# Patient Record
Sex: FEMALE | Race: WHITE | NOT HISPANIC OR LATINO | URBAN - METROPOLITAN AREA
[De-identification: names, ages, dates, MRNs, and addresses within clinical notes are randomized per-mention and may not be internally consistent; named-entity substitution may affect disease eponyms.]

---

## 2020-07-25 ENCOUNTER — TELEPHONE ENCOUNTER (OUTPATIENT)
Dept: URBAN - METROPOLITAN AREA CLINIC 13 | Facility: CLINIC | Age: 75
End: 2020-07-25

## 2020-07-25 RX ORDER — TRAMADOL HYDROCHLORIDE 50 MG/1
TAKE 1 TABLET BY MOUTH EVERY 6 HOURS AS NEEDED FOR PAIN TABLET ORAL
Qty: 60 | Refills: 1 | OUTPATIENT
Start: 2018-04-12 | End: 2019-10-16

## 2020-07-25 RX ORDER — VALSARTAN 80 MG/1
TAKE 1 TABLET DAILY TABLET ORAL
Refills: 0 | OUTPATIENT
End: 2018-08-10

## 2020-07-25 RX ORDER — HYDROCORTISONE ACETATE 25 MG/1
INSERT 1 SUPP BEDTIME SUPPOSITORY RECTAL
Qty: 10 | Refills: 0 | OUTPATIENT
Start: 2018-08-10 | End: 2019-10-16

## 2020-07-25 RX ORDER — CALCIUM CARBONATE/VITAMIN D3 600 MG-10
TAKE AS DIRECTED TABLET ORAL
Refills: 0 | OUTPATIENT
End: 2019-11-13

## 2020-07-25 RX ORDER — OMEPRAZOLE 20 MG/1
TAKE 1 CAPSULE DAILY CAPSULE, DELAYED RELEASE ORAL
Refills: 4 | OUTPATIENT
End: 2019-10-16

## 2020-07-25 RX ORDER — SOTALOL HYDROCHLORIDE 80 MG/1
TAKE 1 TABLET EVERY 12 HOURS DAILY TABLET ORAL
Refills: 0 | OUTPATIENT
End: 2019-10-16

## 2020-07-25 RX ORDER — SACCHAROMYCES BOULARDII 250 MG
TAKE 1 CAPSULE DAILY. UNKNOWN DOSAGE PER PATIENT CAPSULE ORAL
Refills: 0 | OUTPATIENT
End: 2019-10-16

## 2020-07-25 RX ORDER — NYSTATIN 100000 [USP'U]/ML
APPLY 1 ML 4 TIMES DAILY TO EACH CHEEK WITH DROPPER, THEN MASSAGE WITH CLEAN FINGER SUSPENSION ORAL
Refills: 0 | OUTPATIENT
End: 2017-07-24

## 2020-07-26 ENCOUNTER — TELEPHONE ENCOUNTER (OUTPATIENT)
Dept: URBAN - METROPOLITAN AREA CLINIC 13 | Facility: CLINIC | Age: 75
End: 2020-07-26

## 2020-07-26 RX ORDER — LEVOTHYROXINE SODIUM 0.07 MG/1
TABLET ORAL
Qty: 90 | Refills: 0 | Status: ACTIVE | COMMUNITY
Start: 2019-08-27

## 2020-07-26 RX ORDER — TRAMADOL HYDROCHLORIDE 50 MG/1
TABLET ORAL
Qty: 20 | Refills: 0 | Status: ACTIVE | COMMUNITY
Start: 2018-04-12

## 2020-07-26 RX ORDER — HYDROCODONE BITARTRATE AND ACETAMINOPHEN 5; 325 MG/1; MG/1
TABLET ORAL
Qty: 20 | Refills: 0 | Status: ACTIVE | COMMUNITY
Start: 2018-04-16

## 2020-07-26 RX ORDER — AZITHROMYCIN DIHYDRATE 250 MG/1
TABLET, FILM COATED ORAL
Qty: 6 | Refills: 0 | Status: ACTIVE | COMMUNITY
Start: 2019-10-04

## 2020-07-26 RX ORDER — OLMESARTAN MEDOXOMIL 20 MG/1
TAKE 1 TABLET DAILY TABLET, FILM COATED ORAL
Refills: 0 | Status: ACTIVE | COMMUNITY
Start: 2018-07-16

## 2020-07-26 RX ORDER — UBIDECARENONE 100 MG
TAKE 1 CAPSULE TWICE DAILY CAPSULE ORAL
Refills: 0 | Status: ACTIVE | COMMUNITY

## 2020-07-26 RX ORDER — LEVOTHYROXINE SODIUM 0.05 MG/1
TABLET ORAL
Qty: 90 | Refills: 0 | Status: ACTIVE | COMMUNITY
Start: 2019-10-29

## 2020-07-26 RX ORDER — CALCIUM CARBONATE 500(1250)
TAKE 1 TABLET DAILY TABLET ORAL
Refills: 0 | Status: ACTIVE | COMMUNITY

## 2020-07-26 RX ORDER — ESZOPICLONE 2 MG
TAKE 1 TABLET AT BEDTIME TABLET ORAL
Refills: 0 | Status: ACTIVE | COMMUNITY

## 2020-07-26 RX ORDER — NEOMYCIN AND POLYMYXIN B SULFATES AND DEXAMETHASONE 1; 3.5; 1 MG/G; MG/G; [IU]/G
OINTMENT OPHTHALMIC
Qty: 4 | Refills: 0 | Status: ACTIVE | COMMUNITY
Start: 2019-01-18

## 2020-07-26 RX ORDER — AMOXICILLIN 500 MG/1
CAPSULE ORAL
Qty: 23 | Refills: 0 | Status: ACTIVE | COMMUNITY
Start: 2018-05-04

## 2020-07-26 RX ORDER — ROSUVASTATIN CALCIUM 5 MG
TAKE 1 TABLET DAILY TABLET ORAL
Refills: 0 | Status: ACTIVE | COMMUNITY

## 2020-07-26 RX ORDER — AZITHROMYCIN DIHYDRATE 250 MG/1
TABLET, FILM COATED ORAL
Qty: 6 | Refills: 0 | Status: ACTIVE | COMMUNITY
Start: 2018-01-04

## 2020-07-26 RX ORDER — OSELTAMIVIR PHOSPHATE 75 MG/1
CAPSULE ORAL
Qty: 10 | Refills: 0 | Status: ACTIVE | COMMUNITY
Start: 2018-01-15

## 2020-07-26 RX ORDER — NEOMYCIN SULFATE, POLYMYXIN B SULFATE AND DEXAMETHASONE 3.5; 10000; 1 MG/ML; [USP'U]/ML; MG/ML
SUSPENSION/ DROPS OPHTHALMIC
Qty: 5 | Refills: 0 | Status: ACTIVE | COMMUNITY
Start: 2019-01-17

## 2020-07-26 RX ORDER — LEVOTHYROXINE SODIUM 0.05 MG/1
TAKE 1 TABLET DAILY TABLET ORAL
Refills: 0 | Status: ACTIVE | COMMUNITY

## 2020-07-26 RX ORDER — FLECAINIDE ACETATE 50 MG/1
TABLET ORAL
Qty: 30 | Refills: 0 | Status: ACTIVE | COMMUNITY
Start: 2019-09-19

## 2020-07-26 RX ORDER — CEPHALEXIN 500 MG/1
CAPSULE ORAL
Qty: 14 | Refills: 0 | Status: ACTIVE | COMMUNITY
Start: 2019-01-17

## 2020-07-26 RX ORDER — PENICILLIN V POTASSIUM 500 MG/1
TABLET, FILM COATED ORAL
Qty: 40 | Refills: 0 | Status: ACTIVE | COMMUNITY
Start: 2018-04-12

## 2020-07-26 RX ORDER — TOBRAMYCIN AND DEXAMETHASONE 3; 1 MG/ML; MG/ML
SUSPENSION/ DROPS OPHTHALMIC
Qty: 2 | Refills: 0 | Status: ACTIVE | COMMUNITY
Start: 2018-01-05

## 2020-07-26 RX ORDER — LEVOTHYROXINE SODIUM 0.05 MG/1
TABLET ORAL
Qty: 90 | Refills: 0 | Status: ACTIVE | COMMUNITY
Start: 2018-08-23

## 2020-07-26 RX ORDER — OMEPRAZOLE 20 MG/1
TAKE 1 CAPSULE TWICE DAILY 30 MINUTES PRIOR TO BREAKFAST AND DINNER CAPSULE, DELAYED RELEASE ORAL
Qty: 180 | Refills: 3 | Status: ACTIVE | COMMUNITY
Start: 2017-12-05

## 2020-07-26 RX ORDER — DOXYCYCLINE 50 MG/1
CAPSULE ORAL
Qty: 21 | Refills: 0 | Status: ACTIVE | COMMUNITY
Start: 2019-04-11

## 2020-07-26 RX ORDER — RALOXIFENE HCL 60 MG
TAKE 1 TABLET DAILY TABLET ORAL
Refills: 0 | Status: ACTIVE | COMMUNITY

## 2020-10-14 ENCOUNTER — OFFICE VISIT (OUTPATIENT)
Dept: URBAN - METROPOLITAN AREA CLINIC 113 | Facility: CLINIC | Age: 75
End: 2020-10-14
Payer: MEDICARE

## 2020-10-14 VITALS
HEART RATE: 98 BPM | BODY MASS INDEX: 26.12 KG/M2 | RESPIRATION RATE: 18 BRPM | DIASTOLIC BLOOD PRESSURE: 87 MMHG | TEMPERATURE: 98.2 F | WEIGHT: 153 LBS | HEIGHT: 64 IN | SYSTOLIC BLOOD PRESSURE: 155 MMHG

## 2020-10-14 DIAGNOSIS — K21.9 GASTROESOPHAGEAL REFLUX DISEASE, UNSPECIFIED WHETHER ESOPHAGITIS PRESENT: ICD-10-CM

## 2020-10-14 DIAGNOSIS — R05 COUGH: ICD-10-CM

## 2020-10-14 PROCEDURE — G8427 DOCREV CUR MEDS BY ELIG CLIN: HCPCS | Performed by: NURSE PRACTITIONER

## 2020-10-14 PROCEDURE — 99213 OFFICE O/P EST LOW 20 MIN: CPT | Performed by: NURSE PRACTITIONER

## 2020-10-14 RX ORDER — FLUTICASONE PROPIONATE 50 UG/1
1 SPRAY IN EACH NOSTRIL SPRAY, METERED NASAL ONCE A DAY
Status: ACTIVE | COMMUNITY

## 2020-10-14 RX ORDER — RALOXIFENE HCL 60 MG
TAKE 1 TABLET DAILY TABLET ORAL
Refills: 0 | Status: ACTIVE | COMMUNITY

## 2020-10-14 RX ORDER — OMEPRAZOLE 40 MG/1
TAKE 1 CAPSULE TWICE DAILY 30 MINUTES PRIOR TO BREAKFAST AND DINNER CAPSULE, DELAYED RELEASE ORAL
Refills: 3 | Status: ACTIVE | COMMUNITY
Start: 2017-12-05

## 2020-10-14 RX ORDER — ROSUVASTATIN CALCIUM 5 MG
TAKE 1 TABLET DAILY TABLET ORAL
Refills: 0 | Status: ACTIVE | COMMUNITY

## 2020-10-14 RX ORDER — LEVOTHYROXINE SODIUM 0.07 MG/1
TABLET ORAL
Qty: 90 | Refills: 0 | Status: ACTIVE | COMMUNITY
Start: 2019-08-27

## 2020-10-14 RX ORDER — ESZOPICLONE 2 MG
TAKE 1 TABLET AT BEDTIME TABLET ORAL
Refills: 0 | Status: ACTIVE | COMMUNITY

## 2020-10-14 RX ORDER — OLMESARTAN MEDOXOMIL 20 MG/1
TAKE 1 TABLET DAILY TABLET, FILM COATED ORAL
Refills: 0 | Status: ACTIVE | COMMUNITY
Start: 2018-07-16

## 2020-10-14 NOTE — HPI-TODAY'S VISIT:
This is a 74-year-old female with a history of hypothyroidism, vitamin D deficiency, hyperlipidemia, leukopenia, hypertension, aortic regurgitation, SVT, osteopenia, Sjogren's, and epigastric abdominal pain presenting for follow-up.   She was last seen 12/17/2019.  She had  experienced epigastric pain and chest pressure which were determined to be side effects of flecainide.  Her symptoms had resolved when she stopped the medication.  Prior work-up including labs and a CT scan were unremarkable.  She had not responded to omeprazole and was instructed that she could discontinue it. She reports onset of symptoms in late September.  She felt unwell with extreme fatigue and dry cough.  She began having chest discomfort further described as "soreness in my sternum."  This was aggravated by outside pressure.  She reports an associated sensation that, if she bends at the waist, she feels "a flip-flop or something not where it should be" in her chest.  She has a chronic dry mouth for which she drinks frequently and eats frequently.  She states this causes her to "take any ear.  Frequently, when she is eating or drinking, she has an isolated hiccup without belching.  She denies heartburn, regurgitation, dysphagia, or any other abdominal symptoms.  She was tested for Covid which was reportedly negative.  She had pulmonary function test reporting "excellent" results.  Her primary care physician reported a finding of fluid in your ears and postnasal drip.  She increased omeprazole to 40 mg daily and began using Flonase daily.  Her cough and chest soreness has improved. Cough was not associated with meals, lying flat, or occurring in the early morning when it was present.  She takes Aleve less than once a week.  She is having regular bowel movements without red blood or melena. EGD in November 2019 notable for fundic gland polyps.

## 2020-10-15 PROBLEM — 49727002: Status: ACTIVE | Noted: 2020-10-15

## 2020-11-16 ENCOUNTER — OFFICE VISIT (OUTPATIENT)
Dept: URBAN - METROPOLITAN AREA CLINIC 113 | Facility: CLINIC | Age: 75
End: 2020-11-16
Payer: MEDICARE

## 2020-11-16 ENCOUNTER — WEB ENCOUNTER (OUTPATIENT)
Dept: URBAN - METROPOLITAN AREA CLINIC 113 | Facility: CLINIC | Age: 75
End: 2020-11-16

## 2020-11-16 VITALS
WEIGHT: 153 LBS | RESPIRATION RATE: 18 BRPM | SYSTOLIC BLOOD PRESSURE: 141 MMHG | HEIGHT: 64 IN | HEART RATE: 76 BPM | BODY MASS INDEX: 26.12 KG/M2 | TEMPERATURE: 98.1 F | DIASTOLIC BLOOD PRESSURE: 77 MMHG

## 2020-11-16 DIAGNOSIS — R07.89 ATYPICAL CHEST PAIN: ICD-10-CM

## 2020-11-16 DIAGNOSIS — K21.9 GASTROESOPHAGEAL REFLUX DISEASE, UNSPECIFIED WHETHER ESOPHAGITIS PRESENT: ICD-10-CM

## 2020-11-16 PROBLEM — 235595009: Status: ACTIVE | Noted: 2020-10-15

## 2020-11-16 PROBLEM — 102589003: Status: ACTIVE | Noted: 2020-11-16

## 2020-11-16 PROCEDURE — 99213 OFFICE O/P EST LOW 20 MIN: CPT | Performed by: NURSE PRACTITIONER

## 2020-11-16 PROCEDURE — G8427 DOCREV CUR MEDS BY ELIG CLIN: HCPCS | Performed by: NURSE PRACTITIONER

## 2020-11-16 PROCEDURE — G8482 FLU IMMUNIZE ORDER/ADMIN: HCPCS | Performed by: NURSE PRACTITIONER

## 2020-11-16 RX ORDER — ESZOPICLONE 2 MG
TAKE 1 TABLET AT BEDTIME TABLET ORAL
Refills: 0 | Status: ACTIVE | COMMUNITY

## 2020-11-16 RX ORDER — RALOXIFENE HCL 60 MG
TAKE 1 TABLET DAILY TABLET ORAL
Refills: 0 | Status: ACTIVE | COMMUNITY

## 2020-11-16 RX ORDER — LEVOTHYROXINE SODIUM 0.07 MG/1
1 CAPSULE TABLET ORAL ONCE A DAY
Refills: 0 | Status: ACTIVE | COMMUNITY
Start: 2019-08-27

## 2020-11-16 RX ORDER — ROSUVASTATIN CALCIUM 5 MG
TAKE 1 TABLET DAILY TABLET ORAL
Refills: 0 | Status: ACTIVE | COMMUNITY

## 2020-11-16 RX ORDER — FLUTICASONE PROPIONATE 50 UG/1
1 SPRAY IN EACH NOSTRIL SPRAY, METERED NASAL ONCE A DAY
Status: ACTIVE | COMMUNITY

## 2020-11-16 RX ORDER — OLMESARTAN MEDOXOMIL 20 MG/1
TAKE 1 TABLET DAILY TABLET, FILM COATED ORAL
Refills: 0 | Status: ACTIVE | COMMUNITY
Start: 2018-07-16

## 2020-11-16 RX ORDER — OMEPRAZOLE 40 MG/1
TAKE 1 CAPSULE TWICE DAILY 30 MINUTES PRIOR TO BREAKFAST AND DINNER CAPSULE, DELAYED RELEASE ORAL
Refills: 3 | Status: ACTIVE | COMMUNITY
Start: 2017-12-05

## 2020-11-16 NOTE — HPI-TODAY'S VISIT:
This is a 74-year-old female with a history of hypothyroidism, vitamin D deficiency, hyperlipidemia, leukopenia, hypertension, aortic regurgitation, SVT, osteopenia, Sjogren's, epigastric pain, GERD, and cough presenting to discuss an EGD  She was last seen 10/14/2020.  She reported onset of symptoms in late September including extreme fatigue, a dry cough unrelated to meals, and "soreness" in the sternal area.  She had an odd sensation in her chest associated with bending at the waist.  She reported negative Covid testing and a normal pulmonary function test.  Her primary care physician reported a finding of fluid in her ears and a postnasal drip.  Omeprazole was increased to 40 mg daily and she began using Flonase daily.  Cough and chest soreness improved.  It was discussed that her symptoms may have been multifactorial associated with acid reflux and postnasal drip.  She was instructed to continue omeprazole 40 mg daily and reduce to 20 mg daily in 2 to 3 months.  She was to discuss duration of therapy with Flonase with her primary care physician.  She continues to take omeprazole 40 mg daily.  Her cough has improved.  It continues to occur intermittently.  She is complaining of "soreness and pressure" in her chest.  Symptoms are located in the substernal area.  She reports discomfort associated with making certain movements.  She denies symptoms  Discomfort is unassociated with exercise or exertion.  She has been unable to wear her bra because outside pressure aggravates symptoms.  Bending at the waist and twisting exacerbate pain.  She has not identified a clear association with eating.  She denies heartburn, regurgitation, dysphagia, abdominal pain, nausea, or any other abdominal symptoms.  CT scan.  She reports good pulmonary function testing and a negative chest x-ray twice last year.  Her pulmonologist has suggested  CT scan but she is concerned regarding radiation exposure. She is under regular care of her cardiologist with whom she will follow up in January but has not discussed her symptoms with him.  She considered self treating  for possible costochondritis with Advil for 10 days but was concerned that, if her symptoms were associated with her esophagus, she may aggravate this condition.

## 2020-12-22 ENCOUNTER — LAB OUTSIDE AN ENCOUNTER (OUTPATIENT)
Dept: URBAN - METROPOLITAN AREA CLINIC 113 | Facility: CLINIC | Age: 75
End: 2020-12-22

## 2020-12-22 ENCOUNTER — OFFICE VISIT (OUTPATIENT)
Dept: URBAN - METROPOLITAN AREA CLINIC 113 | Facility: CLINIC | Age: 75
End: 2020-12-22
Payer: MEDICARE

## 2020-12-22 VITALS
HEIGHT: 64 IN | DIASTOLIC BLOOD PRESSURE: 73 MMHG | SYSTOLIC BLOOD PRESSURE: 139 MMHG | BODY MASS INDEX: 26.29 KG/M2 | TEMPERATURE: 97.7 F | HEART RATE: 74 BPM | WEIGHT: 154 LBS

## 2020-12-22 DIAGNOSIS — R10.13 EPIGASTRIC ABDOMINAL PAIN: ICD-10-CM

## 2020-12-22 PROCEDURE — 99214 OFFICE O/P EST MOD 30 MIN: CPT | Performed by: INTERNAL MEDICINE

## 2020-12-22 RX ORDER — OLMESARTAN MEDOXOMIL 20 MG/1
TAKE 1 TABLET DAILY TABLET, FILM COATED ORAL
Refills: 0 | Status: ACTIVE | COMMUNITY
Start: 2018-07-16

## 2020-12-22 RX ORDER — ROSUVASTATIN CALCIUM 5 MG
TAKE 1 TABLET DAILY TABLET ORAL
Refills: 0 | Status: ACTIVE | COMMUNITY

## 2020-12-22 RX ORDER — RALOXIFENE HCL 60 MG
TAKE 1 TABLET DAILY TABLET ORAL
Refills: 0 | Status: ACTIVE | COMMUNITY

## 2020-12-22 RX ORDER — ESZOPICLONE 1 MG/1
TAKE 1 TABLET AT BEDTIME TABLET, COATED ORAL
Refills: 0 | Status: ACTIVE | COMMUNITY

## 2020-12-22 RX ORDER — LEVOTHYROXINE SODIUM 0.07 MG/1
1 CAPSULE TABLET ORAL ONCE A DAY
Refills: 0 | Status: ACTIVE | COMMUNITY
Start: 2019-08-27

## 2020-12-22 RX ORDER — OMEPRAZOLE 40 MG/1
TAKE 1 CAPSULE TWICE DAILY 30 MINUTES PRIOR TO BREAKFAST AND DINNER CAPSULE, DELAYED RELEASE ORAL
Refills: 3 | Status: ACTIVE | COMMUNITY
Start: 2017-12-05

## 2020-12-22 NOTE — HPI-TODAY'S VISIT:
This is a 75-year-old female with a history of hypothyroidism, vitamin D deficiency, hyperlipidemia, leukopenia, hypertension, aortic regurgitation, SVT, osteopenia, Sjogren's, epigastric pain, GERD, and cough presenting to discuss an EGD  In late September2020, she began having complaints of extreme fatigue, a dry cough unrelated to meals, and "soreness" in the sternal area.  She had an odd sensation in her chest associated with bending at the waist.  She reported negative Covid testing and normal pulmonary function tests.  Her primary care physician reported a finding of fluid in her ears and a postnasal drip.  Omeprazole was increased to 40 mg daily and she began using Flonase daily.  Cough and chest soreness improved.  It was discussed that her symptoms may have been multifactorial associated with acid reflux and postnasal drip.  She was instructed to continue omeprazole 40 mg daily and reduce to 20 mg daily in 2 to 3 months.  She was to discuss duration of  Flonase treatmentwith her primary care physician.  When she was last seen here on November 16, 2020, her cough had improved although still occurred intermittently.  She still complains of"soreness and pressure" in her chest.  Symptoms are located in the substernal area.  She reports discomfort associated with making certain movements.  Discomfort is unassociated with exercise or exertion.  She has been unable to wear her bra because outside pressure aggravates symptoms.  Bending at the waist and twisting exacerbate pain.  She has not identified a clear association with eating.  She denies heartburn, regurgitation, dysphagia, abdominal pain, nausea, or any other abdominal symptoms.  CT scan.  She reports good pulmonary function testing and a negative chest x-ray twice last year.  Her pulmonologist has suggested  CT scan but she is concerned regarding radiation exposure. She is under regular care of her cardiologist with whom she will follow up in January but has not discussed her symptoms with him.  She considered self treating  for possible costochondritis with Advil for 10 days but was concerned that, if her symptoms were associated with her esophagus, she may aggravate this condition.     The patient continues to have this discomfort.  As a pressure type discomfort which is uncomfortable for her.  She tried increasing her omeprazole to 40 mg twice daily with no effect.  She has no reflux symptoms.  She had a recent CT angiogram done and the [facility] center which was negative.  However, incidentally, she was found to have hypertrophic thoracic spondylosis, an incidental 9 mm calcified splenic artery aneurysm, and hypertrophic gastritis.  Because of the hypertrophic gastritis, Consultation was requested for consideration of upperendoscopy.

## 2021-01-08 ENCOUNTER — CLAIMS CREATED FROM THE CLAIM WINDOW (OUTPATIENT)
Dept: URBAN - METROPOLITAN AREA CLINIC 4 | Facility: CLINIC | Age: 76
End: 2021-01-08
Payer: MEDICARE

## 2021-01-08 ENCOUNTER — OFFICE VISIT (OUTPATIENT)
Dept: URBAN - METROPOLITAN AREA SURGERY CENTER 25 | Facility: SURGERY CENTER | Age: 76
End: 2021-01-08
Payer: MEDICARE

## 2021-01-08 DIAGNOSIS — R93.3 ABN FINDINGS-GI TRACT: ICD-10-CM

## 2021-01-08 DIAGNOSIS — R10.13 ABDOMINAL DISCOMFORT, EPIGASTRIC: ICD-10-CM

## 2021-01-08 DIAGNOSIS — K31.89 ACQUIRED DEFORMITY OF DUODENUM: ICD-10-CM

## 2021-01-08 DIAGNOSIS — K29.60 OTHER GASTRITIS WITHOUT BLEEDING: ICD-10-CM

## 2021-01-08 PROCEDURE — 88305 TISSUE EXAM BY PATHOLOGIST: CPT | Performed by: PATHOLOGY

## 2021-01-08 PROCEDURE — 88312 SPECIAL STAINS GROUP 1: CPT | Performed by: PATHOLOGY

## 2021-01-08 PROCEDURE — 43239 EGD BIOPSY SINGLE/MULTIPLE: CPT | Performed by: INTERNAL MEDICINE

## 2021-01-08 PROCEDURE — G8907 PT DOC NO EVENTS ON DISCHARG: HCPCS | Performed by: INTERNAL MEDICINE

## 2021-01-08 RX ORDER — LEVOTHYROXINE SODIUM 0.07 MG/1
1 CAPSULE TABLET ORAL ONCE A DAY
Refills: 0 | Status: ACTIVE | COMMUNITY
Start: 2019-08-27

## 2021-01-08 RX ORDER — ESZOPICLONE 1 MG/1
TAKE 1 TABLET AT BEDTIME TABLET, COATED ORAL
Refills: 0 | Status: ACTIVE | COMMUNITY

## 2021-01-08 RX ORDER — OMEPRAZOLE 40 MG/1
TAKE 1 CAPSULE TWICE DAILY 30 MINUTES PRIOR TO BREAKFAST AND DINNER CAPSULE, DELAYED RELEASE ORAL
Refills: 3 | Status: ACTIVE | COMMUNITY
Start: 2017-12-05

## 2021-01-08 RX ORDER — OLMESARTAN MEDOXOMIL 20 MG/1
TAKE 1 TABLET DAILY TABLET, FILM COATED ORAL
Refills: 0 | Status: ACTIVE | COMMUNITY
Start: 2018-07-16

## 2021-01-08 RX ORDER — ROSUVASTATIN CALCIUM 5 MG
TAKE 1 TABLET DAILY TABLET ORAL
Refills: 0 | Status: ACTIVE | COMMUNITY

## 2021-01-08 RX ORDER — RALOXIFENE HCL 60 MG
TAKE 1 TABLET DAILY TABLET ORAL
Refills: 0 | Status: ACTIVE | COMMUNITY

## 2021-01-18 ENCOUNTER — OFFICE VISIT (OUTPATIENT)
Dept: URBAN - METROPOLITAN AREA CLINIC 113 | Facility: CLINIC | Age: 76
End: 2021-01-18

## 2021-01-20 ENCOUNTER — WEB ENCOUNTER (OUTPATIENT)
Dept: URBAN - METROPOLITAN AREA CLINIC 113 | Facility: CLINIC | Age: 76
End: 2021-01-20

## 2021-03-02 ENCOUNTER — OFFICE VISIT (OUTPATIENT)
Dept: URBAN - METROPOLITAN AREA CLINIC 113 | Facility: CLINIC | Age: 76
End: 2021-03-02

## 2021-11-30 ENCOUNTER — WEB ENCOUNTER (OUTPATIENT)
Dept: URBAN - METROPOLITAN AREA SURGERY CENTER 25 | Facility: SURGERY CENTER | Age: 76
End: 2021-11-30

## 2021-12-14 ENCOUNTER — WEB ENCOUNTER (OUTPATIENT)
Dept: URBAN - METROPOLITAN AREA CLINIC 113 | Facility: CLINIC | Age: 76
End: 2021-12-14

## 2021-12-14 ENCOUNTER — OFFICE VISIT (OUTPATIENT)
Dept: URBAN - METROPOLITAN AREA CLINIC 113 | Facility: CLINIC | Age: 76
End: 2021-12-14
Payer: MEDICARE

## 2021-12-14 VITALS
SYSTOLIC BLOOD PRESSURE: 141 MMHG | DIASTOLIC BLOOD PRESSURE: 87 MMHG | HEIGHT: 64 IN | RESPIRATION RATE: 18 BRPM | HEART RATE: 88 BPM | TEMPERATURE: 97.1 F | WEIGHT: 155 LBS | BODY MASS INDEX: 26.46 KG/M2

## 2021-12-14 DIAGNOSIS — R19.7 DIARRHEA: ICD-10-CM

## 2021-12-14 DIAGNOSIS — K62.5 BRIGHT RED BLOOD PER RECTUM: ICD-10-CM

## 2021-12-14 PROCEDURE — 99214 OFFICE O/P EST MOD 30 MIN: CPT | Performed by: NURSE PRACTITIONER

## 2021-12-14 RX ORDER — ROSUVASTATIN CALCIUM 5 MG
TAKE 1 TABLET DAILY TABLET ORAL
Refills: 0 | Status: ACTIVE | COMMUNITY

## 2021-12-14 RX ORDER — LEVOTHYROXINE SODIUM 0.05 MG/1
1 CAPSULE TABLET ORAL ONCE A DAY
Refills: 0 | Status: ACTIVE | COMMUNITY
Start: 2019-08-27

## 2021-12-14 RX ORDER — SODIUM, POTASSIUM,MAG SULFATES 17.5-3.13G
177ML SOLUTION, RECONSTITUTED, ORAL ORAL
Qty: 354 MILLILITER | Refills: 0 | OUTPATIENT
Start: 2021-12-14 | End: 2021-12-16

## 2021-12-14 RX ORDER — OMEPRAZOLE 40 MG/1
TAKE 1 CAPSULE TWICE DAILY 30 MINUTES PRIOR TO BREAKFAST AND DINNER CAPSULE, DELAYED RELEASE ORAL
Refills: 3 | Status: ACTIVE | COMMUNITY
Start: 2017-12-05

## 2021-12-14 RX ORDER — ESZOPICLONE 1 MG/1
1 TABLET IMMEDIATELY BEFORE BEDTIME TABLET, COATED ORAL ONCE A DAY
Status: ACTIVE | COMMUNITY

## 2021-12-14 RX ORDER — RALOXIFENE HCL 60 MG
TAKE 1 TABLET DAILY TABLET ORAL
Refills: 0 | Status: ACTIVE | COMMUNITY

## 2021-12-14 RX ORDER — ESZOPICLONE 1 MG/1
TAKE 1 TABLET AT BEDTIME TABLET, COATED ORAL
Refills: 0 | Status: ACTIVE | COMMUNITY

## 2021-12-14 RX ORDER — OLMESARTAN MEDOXOMIL 20 MG/1
TAKE 1 TABLET DAILY TABLET, FILM COATED ORAL
Refills: 0 | Status: ACTIVE | COMMUNITY
Start: 2018-07-16

## 2021-12-14 NOTE — HPI-TODAY'S VISIT:
76-year-old female with a history of hypothyroidism, vitamin D deficiency, hyperlipidemia, leukopenia, hypertension, aortic regurgitation, SVT, osteopenia, Sjogren's, epigastric pain, GERD, and cough presenting for evaluation of red blood per rectum. She was last seen 12/22/2020 for follow-up of epigastric and atypical chest pain.  Recent CTA incidentally noted hypertrophic gastritis.  An upper endoscopy was planned. EGD 1/8/2021 revealed a normal esophagus, small hiatal hernia, chronic gastritis.  Gastric biopsy showed chemical/reactive gastropathy, negative for H. pylori. She did not return for follow-up after the procedure.  She has chronic constipation which has been managed with MiraLAX once daily. Within the last month, she has experienced a diarrhea predominant change in bowel habits with associated excess gas. At onset, she was having multiple watery stools per day. Within the last few days, she has had 1-2 soft or loose stools daily. She denies nocturnal defecation. About 10 days ago, she experienced the onset of small volume red blood per rectum and rectal itching, which has persisted. She will pass a small volume of blood even with passage of flatus. She reports temporary relief with preparation H and calmoseptine. She denies rectal pain. No abdominal pain, nausea or vomiting. She denies recent medication changes or antibiotic use. She did stop her MiraLAX, various vitamins and supplements, and eliminated artificial sweeteners from the diet. She began a probiotic a few days ago.

## 2021-12-14 NOTE — HPI-OTHER HISTORIES
She had a CT angiogram in late 2020 at the [facility] which was negative.  However, incidentally, she was found to have hypertrophic thoracic spondylosis, an incidental 9 mm calcified splenic artery aneurysm, and hypertrophic gastritis.  Because of the hypertrophic gastritis, consultation was requested for consideration of upper endoscopy.  Colonoscopy 7/2/2018:Diverticulosis in the sigmoid and descending colon, nonbleeding internal hemorrhoids.

## 2021-12-16 PROBLEM — 4556007 GASTRITIS: Status: ACTIVE | Noted: 2021-12-16

## 2021-12-29 ENCOUNTER — CLAIMS CREATED FROM THE CLAIM WINDOW (OUTPATIENT)
Dept: URBAN - METROPOLITAN AREA CLINIC 4 | Facility: CLINIC | Age: 76
End: 2021-12-29
Payer: MEDICARE

## 2021-12-29 ENCOUNTER — OFFICE VISIT (OUTPATIENT)
Dept: URBAN - METROPOLITAN AREA SURGERY CENTER 25 | Facility: SURGERY CENTER | Age: 76
End: 2021-12-29
Payer: MEDICARE

## 2021-12-29 DIAGNOSIS — K52.832 LYMPHOCYTIC  COLITIS: ICD-10-CM

## 2021-12-29 DIAGNOSIS — K52.832 LYMPHOCYTIC COLITIS: ICD-10-CM

## 2021-12-29 PROCEDURE — 45380 COLONOSCOPY AND BIOPSY: CPT | Performed by: INTERNAL MEDICINE

## 2021-12-29 PROCEDURE — G8907 PT DOC NO EVENTS ON DISCHARG: HCPCS | Performed by: INTERNAL MEDICINE

## 2021-12-29 PROCEDURE — 88342 IMHCHEM/IMCYTCHM 1ST ANTB: CPT | Performed by: PATHOLOGY

## 2021-12-29 PROCEDURE — 88313 SPECIAL STAINS GROUP 2: CPT | Performed by: PATHOLOGY

## 2021-12-29 PROCEDURE — 88305 TISSUE EXAM BY PATHOLOGIST: CPT | Performed by: PATHOLOGY

## 2021-12-29 RX ORDER — OMEPRAZOLE 40 MG/1
TAKE 1 CAPSULE TWICE DAILY 30 MINUTES PRIOR TO BREAKFAST AND DINNER CAPSULE, DELAYED RELEASE ORAL
Refills: 3 | Status: ACTIVE | COMMUNITY
Start: 2017-12-05

## 2021-12-29 RX ORDER — ESZOPICLONE 1 MG/1
TAKE 1 TABLET AT BEDTIME TABLET, COATED ORAL
Refills: 0 | Status: ACTIVE | COMMUNITY

## 2021-12-29 RX ORDER — RALOXIFENE HCL 60 MG
TAKE 1 TABLET DAILY TABLET ORAL
Refills: 0 | Status: ACTIVE | COMMUNITY

## 2021-12-29 RX ORDER — ESZOPICLONE 1 MG/1
1 TABLET IMMEDIATELY BEFORE BEDTIME TABLET, COATED ORAL ONCE A DAY
Status: ACTIVE | COMMUNITY

## 2021-12-29 RX ORDER — LEVOTHYROXINE SODIUM 0.05 MG/1
1 CAPSULE TABLET ORAL ONCE A DAY
Refills: 0 | Status: ACTIVE | COMMUNITY
Start: 2019-08-27

## 2021-12-29 RX ORDER — ROSUVASTATIN CALCIUM 5 MG
TAKE 1 TABLET DAILY TABLET ORAL
Refills: 0 | Status: ACTIVE | COMMUNITY

## 2021-12-29 RX ORDER — OLMESARTAN MEDOXOMIL 20 MG/1
TAKE 1 TABLET DAILY TABLET, FILM COATED ORAL
Refills: 0 | Status: ACTIVE | COMMUNITY
Start: 2018-07-16

## 2022-01-10 ENCOUNTER — WEB ENCOUNTER (OUTPATIENT)
Dept: URBAN - METROPOLITAN AREA SURGERY CENTER 25 | Facility: SURGERY CENTER | Age: 77
End: 2022-01-10

## 2022-01-13 ENCOUNTER — WEB ENCOUNTER (OUTPATIENT)
Dept: URBAN - METROPOLITAN AREA CLINIC 113 | Facility: CLINIC | Age: 77
End: 2022-01-13

## 2022-01-13 ENCOUNTER — OFFICE VISIT (OUTPATIENT)
Dept: URBAN - METROPOLITAN AREA CLINIC 107 | Facility: CLINIC | Age: 77
End: 2022-01-13

## 2022-01-24 ENCOUNTER — OFFICE VISIT (OUTPATIENT)
Dept: URBAN - METROPOLITAN AREA SURGERY CENTER 25 | Facility: SURGERY CENTER | Age: 77
End: 2022-01-24

## 2022-02-16 ENCOUNTER — LAB OUTSIDE AN ENCOUNTER (OUTPATIENT)
Dept: URBAN - METROPOLITAN AREA CLINIC 113 | Facility: CLINIC | Age: 77
End: 2022-02-16

## 2022-02-16 ENCOUNTER — OFFICE VISIT (OUTPATIENT)
Dept: URBAN - METROPOLITAN AREA CLINIC 113 | Facility: CLINIC | Age: 77
End: 2022-02-16
Payer: MEDICARE

## 2022-02-16 VITALS
RESPIRATION RATE: 18 BRPM | DIASTOLIC BLOOD PRESSURE: 73 MMHG | TEMPERATURE: 96.9 F | BODY MASS INDEX: 26.46 KG/M2 | HEIGHT: 64 IN | WEIGHT: 155 LBS | HEART RATE: 73 BPM | SYSTOLIC BLOOD PRESSURE: 133 MMHG

## 2022-02-16 DIAGNOSIS — K64.8 INTERNAL HEMORRHOIDS: ICD-10-CM

## 2022-02-16 DIAGNOSIS — K62.5 BRIGHT RED BLOOD PER RECTUM: ICD-10-CM

## 2022-02-16 DIAGNOSIS — K59.09 CHRONIC CONSTIPATION: ICD-10-CM

## 2022-02-16 PROCEDURE — 99214 OFFICE O/P EST MOD 30 MIN: CPT | Performed by: INTERNAL MEDICINE

## 2022-02-16 RX ORDER — ROSUVASTATIN CALCIUM 5 MG
1 TABLET TABLET ORAL ONCE A DAY
Refills: 0 | Status: ACTIVE | COMMUNITY

## 2022-02-16 RX ORDER — OLMESARTAN MEDOXOMIL 20 MG/1
TAKE 1 TABLET DAILY TABLET, FILM COATED ORAL
Refills: 0 | Status: ACTIVE | COMMUNITY
Start: 2018-07-16

## 2022-02-16 RX ORDER — MELOXICAM 15 MG/1
1 TABLET TABLET ORAL ONCE A DAY
Status: ACTIVE | COMMUNITY

## 2022-02-16 RX ORDER — ESZOPICLONE 1 MG/1
TAKE 1 TABLET AT BEDTIME TABLET, COATED ORAL
Refills: 0 | Status: ACTIVE | COMMUNITY

## 2022-02-16 RX ORDER — OMEPRAZOLE 40 MG/1
TAKE 1 CAPSULE TWICE DAILY 30 MINUTES PRIOR TO BREAKFAST AND DINNER CAPSULE, DELAYED RELEASE ORAL
Refills: 3 | Status: ACTIVE | COMMUNITY
Start: 2017-12-05

## 2022-02-16 RX ORDER — ESZOPICLONE 1 MG/1
1 TABLET IMMEDIATELY BEFORE BEDTIME TABLET, COATED ORAL ONCE A DAY
Status: ACTIVE | COMMUNITY

## 2022-02-16 RX ORDER — LEVOTHYROXINE SODIUM 0.05 MG/1
1 CAPSULE TABLET ORAL ONCE A DAY
Refills: 0 | Status: ACTIVE | COMMUNITY
Start: 2019-08-27

## 2022-02-16 RX ORDER — RALOXIFENE HCL 60 MG
1 TABLET TABLET ORAL ONCE A DAY
Refills: 0 | Status: ACTIVE | COMMUNITY

## 2022-02-16 NOTE — HPI-TODAY'S VISIT:
This is a 76-year-old female with a history of hypothyroidism, vitamin D deficiency, hyperlipidemia, leukopenia, hypertension, aortic regurgitation, SVT, osteopenia, Sjogren's, epigastric pain, GERD, red blood per rectum, and diarrhea presenting for follow-up.  She was last seen 12/14/2020 for evaluation of red blood per rectum and diarrhea. She had a history of chronic constipation managed on MiraLAX. In the month prior to her visit, she experienced diarrhea with associated gas describing multiple watery stools per day. She was having 1 or 2 soft or loose stools per day at the time of her visit and reported a 10-day history of red blood per rectum and perianal itching. It was discussed that red blood per rectum and itching were likely associated with hemorrhoids. Her symptoms were improving with discontinuation of MiraLAX and various supplements along with a daily probiotic. She was instructed to continue Preparation H as needed and calmoseptine as needed. She was scheduled for a colonoscopy. Stool studies were a consideration if diarrhea recurred. She was to continue a daily probiotic and continue to hold MiraLAX.  She has resumed daily MiraLAX for constipation and is having regular bowel movements. She continues to have rectal bleeding that may occur 2 days a week describing red blood on the tissue. She believes she has internal as well as external hemorrhoids. She is interested in definitive treatment. Heartburn is controlled with omeprazole 40 mg daily. She denies any other abdominal symptoms.

## 2022-02-16 NOTE — PHYSICAL EXAM RECTAL:
no external hemorrhoids , no masses palpable , no melena , no red blood , tone decreased , No tenderness on ANUSHA , Skin tags are present.

## 2022-02-16 NOTE — HPI-OTHER HISTORIES
Colonoscopy 12/29/2021: BBPS 9 (Suprep), sigmoid and descending diverticulosis, moderate grade 1 nonbleeding internal hemorrhoids, otherwise normal to the terminal ileum. Random biopsies demonstrated lymphocytic colitis.

## 2022-02-19 PROBLEM — 90458007: Status: ACTIVE | Noted: 2022-02-16

## 2022-02-19 PROBLEM — 405729008 BRIGHT RED BLOOD PER RECTUM: Status: ACTIVE | Noted: 2022-02-16

## 2022-03-28 ENCOUNTER — OFFICE VISIT (OUTPATIENT)
Dept: URBAN - METROPOLITAN AREA CLINIC 113 | Facility: CLINIC | Age: 77
End: 2022-03-28

## 2022-04-04 ENCOUNTER — TELEPHONE ENCOUNTER (OUTPATIENT)
Dept: URBAN - METROPOLITAN AREA CLINIC 113 | Facility: CLINIC | Age: 77
End: 2022-04-04

## 2022-04-29 ENCOUNTER — OFFICE VISIT (OUTPATIENT)
Dept: URBAN - METROPOLITAN AREA CLINIC 113 | Facility: CLINIC | Age: 77
End: 2022-04-29

## 2022-05-12 ENCOUNTER — OFFICE VISIT (OUTPATIENT)
Dept: URBAN - METROPOLITAN AREA CLINIC 113 | Facility: CLINIC | Age: 77
End: 2022-05-12

## 2022-05-25 ENCOUNTER — OFFICE VISIT (OUTPATIENT)
Dept: URBAN - METROPOLITAN AREA CLINIC 113 | Facility: CLINIC | Age: 77
End: 2022-05-25

## 2022-06-20 ENCOUNTER — OFFICE VISIT (OUTPATIENT)
Dept: URBAN - METROPOLITAN AREA CLINIC 113 | Facility: CLINIC | Age: 77
End: 2022-06-20

## 2022-12-10 ENCOUNTER — TELEPHONE ENCOUNTER (OUTPATIENT)
Dept: URBAN - METROPOLITAN AREA CLINIC 113 | Facility: CLINIC | Age: 77
End: 2022-12-10

## 2022-12-10 RX ORDER — DICYCLOMINE HYDROCHLORIDE 10 MG/1
1 CAPSULES CAPSULE ORAL
Qty: 60 | Refills: 0 | OUTPATIENT
Start: 2022-12-10 | End: 2023-01-09

## 2022-12-11 ENCOUNTER — WEB ENCOUNTER (OUTPATIENT)
Dept: URBAN - METROPOLITAN AREA CLINIC 113 | Facility: CLINIC | Age: 77
End: 2022-12-11

## 2022-12-12 ENCOUNTER — TELEPHONE ENCOUNTER (OUTPATIENT)
Dept: URBAN - METROPOLITAN AREA CLINIC 113 | Facility: CLINIC | Age: 77
End: 2022-12-12

## 2022-12-13 ENCOUNTER — OFFICE VISIT (OUTPATIENT)
Dept: URBAN - METROPOLITAN AREA CLINIC 107 | Facility: CLINIC | Age: 77
End: 2022-12-13
Payer: MEDICARE

## 2022-12-13 VITALS
HEART RATE: 77 BPM | WEIGHT: 139 LBS | SYSTOLIC BLOOD PRESSURE: 120 MMHG | DIASTOLIC BLOOD PRESSURE: 73 MMHG | TEMPERATURE: 97.7 F | BODY MASS INDEX: 23.73 KG/M2 | HEIGHT: 64 IN

## 2022-12-13 DIAGNOSIS — K57.30 COLON, DIVERTICULOSIS: ICD-10-CM

## 2022-12-13 DIAGNOSIS — R10.32 LEFT LOWER QUADRANT ABDOMINAL PAIN: ICD-10-CM

## 2022-12-13 DIAGNOSIS — R19.7 DIARRHEA: ICD-10-CM

## 2022-12-13 DIAGNOSIS — K59.09 CHRONIC CONSTIPATION: ICD-10-CM

## 2022-12-13 PROBLEM — 88111009: Status: ACTIVE | Noted: 2022-12-13

## 2022-12-13 PROBLEM — 301716002: Status: ACTIVE | Noted: 2022-12-13

## 2022-12-13 PROBLEM — 236069009: Status: ACTIVE | Noted: 2022-02-16

## 2022-12-13 PROBLEM — 733657002: Status: ACTIVE | Noted: 2022-12-13

## 2022-12-13 PROBLEM — 62315008 DIARRHEA: Status: ACTIVE | Noted: 2022-12-13

## 2022-12-13 PROCEDURE — 99214 OFFICE O/P EST MOD 30 MIN: CPT | Performed by: NURSE PRACTITIONER

## 2022-12-13 RX ORDER — METRONIDAZOLE 500 MG/1
1 TABLET TABLET ORAL THREE TIMES A DAY
Qty: 30 | OUTPATIENT
Start: 2022-12-13 | End: 2022-12-23

## 2022-12-13 RX ORDER — RALOXIFENE HCL 60 MG
1 TABLET TABLET ORAL ONCE A DAY
Refills: 0 | Status: ON HOLD | COMMUNITY

## 2022-12-13 RX ORDER — ESZOPICLONE 1 MG/1
1 TABLET IMMEDIATELY BEFORE BEDTIME TABLET, COATED ORAL ONCE A DAY
Status: ACTIVE | COMMUNITY

## 2022-12-13 RX ORDER — ESZOPICLONE 1 MG/1
TAKE 1 TABLET AT BEDTIME TABLET, COATED ORAL
Refills: 0 | Status: ACTIVE | COMMUNITY

## 2022-12-13 RX ORDER — OLMESARTAN MEDOXOMIL 20 MG/1
TAKE 1 TABLET DAILY TABLET, FILM COATED ORAL
Refills: 0 | Status: ACTIVE | COMMUNITY
Start: 2018-07-16

## 2022-12-13 RX ORDER — DICYCLOMINE HYDROCHLORIDE 10 MG/1
1 CAPSULES CAPSULE ORAL
Qty: 60 | Refills: 0 | Status: ACTIVE | COMMUNITY
Start: 2022-12-10 | End: 2023-01-09

## 2022-12-13 RX ORDER — MELOXICAM 15 MG/1
1 TABLET TABLET ORAL ONCE A DAY
Status: ON HOLD | COMMUNITY

## 2022-12-13 RX ORDER — CIPROFLOXACIN HYDROCHLORIDE 500 MG/1
1 TABLET TABLET, FILM COATED ORAL
Qty: 20 | OUTPATIENT
Start: 2022-12-13 | End: 2022-12-23

## 2022-12-13 RX ORDER — LEVOTHYROXINE SODIUM 0.05 MG/1
1 CAPSULE TABLET ORAL ONCE A DAY
Refills: 0 | Status: ON HOLD | COMMUNITY
Start: 2019-08-27

## 2022-12-13 RX ORDER — AMLODIPINE BESYLATE 5 MG/1
1 TABLET TABLET ORAL ONCE A DAY
Status: ACTIVE | COMMUNITY

## 2022-12-13 RX ORDER — OMEPRAZOLE 40 MG/1
TAKE 1 CAPSULE TWICE DAILY 30 MINUTES PRIOR TO BREAKFAST AND DINNER CAPSULE, DELAYED RELEASE ORAL
Refills: 3 | Status: ON HOLD | COMMUNITY
Start: 2017-12-05

## 2022-12-13 RX ORDER — ROSUVASTATIN CALCIUM 5 MG
1 TABLET TABLET ORAL ONCE A DAY
Refills: 0 | Status: ACTIVE | COMMUNITY

## 2022-12-13 NOTE — HPI-OTHER HISTORIES
Labs 11/29/2022:CBC: WBC 3.9, hemoglobin 10.2, MCV 96.6, platelet 258.  BMP normal with exception of glucose 56, BUN 26.  LFTs normal TB 0.9, , ALT 22, AST 27. Colonoscopy 12/29/2021: BBPS 9 (Suprep), sigmoid and descending diverticulosis, moderate grade 1 nonbleeding internal hemorrhoids, otherwise normal to the terminal ileum. Random biopsies demonstrated lymphocytic colitis.

## 2022-12-13 NOTE — HPI-TODAY'S VISIT:
This is a 77-year-old female with a history of hypothyroidism, vitamin D deficiency, hyperlipidemia, leukopenia, hypertension, aortic regurgitation, SVT, osteopenia, Sjogren's, epigastric pain, GERD, red blood per rectum associated with internal hemorrhoids, and constipation presenting for follow-up. She was last seen 2/16/2022.  She had undergone a colonoscopy in late December 2021 to evaluate diarrhea notable for diverticulosis and moderate hemorrhoids.  Random biopsies demonstrated lymphocytic colitis.  She reported return to chronic constipation after the colonoscopy.  At the time of her last visit, this was managed with MiraLAX daily.  She had ongoing intermittent small-volume red blood per rectum associated with hemorrhoids and was interested in hemorrhoidal banding.  She was to add Benefiber 2 tablespoons daily and continue MiraLAX.  She was scheduled for hemorrhoidal banding and canceled her appointments. She called our office 12/10/2022 complaining of gas pains and alternating bowel habits.  Her appointment was rescheduled to an earlier time and she was prescribed dicyclomine.  She called our office again 12/12/2022 reporting left lower quadrant pain, diarrhea, and weight loss.  She was given an appointment today. She has a history of constipation which she has managed with MiraLAX daily.  A month ago, she began to have loose, "gassy" stools with chills but no fever, fatigue, and feeling generally unwell.  She began a liquid diet which was helpful.  She advance to a brat diet and her symptoms relapse.  She returned to a liquid diet and then a brat diet.  She did not have a bowel movement in 3 days.  She began to have cramps associated with constipation and then had loose and gassy stools.  Currently, she has not had a bowel movement in 3 days.  She also reports associated pain indicated in the left lower quadrant.  She spoke with Dr. Maguire over the weekend who prescribed dicyclomine.  She has been taking this 3 times a day.  She has some relief of pain but persistent symptoms and persistent tenderness.  She denies red blood per rectum or melena.  She has lost 16 pounds since onset of symptoms.  She is no longer taking PPI.  She stopped omeprazole when she began to have symptoms with diarrhea.  She has a chronic history of discomfort in her chest that is "always sensitive."  This is exacerbated by motion.  She denies association with eating.  She denies any other abdominal symptoms. She stopped taking vitamin D, calcium, and vitamin D supplements when she developed diarrhea.  She also stopped taking Avista, meloxicam, and levothyroxine.

## 2022-12-14 ENCOUNTER — TELEPHONE ENCOUNTER (OUTPATIENT)
Dept: URBAN - METROPOLITAN AREA CLINIC 107 | Facility: CLINIC | Age: 77
End: 2022-12-14

## 2023-01-03 ENCOUNTER — OFFICE VISIT (OUTPATIENT)
Dept: URBAN - METROPOLITAN AREA CLINIC 107 | Facility: CLINIC | Age: 78
End: 2023-01-03
Payer: MEDICARE

## 2023-01-03 VITALS
HEART RATE: 68 BPM | DIASTOLIC BLOOD PRESSURE: 82 MMHG | SYSTOLIC BLOOD PRESSURE: 120 MMHG | RESPIRATION RATE: 18 BRPM | BODY MASS INDEX: 23.73 KG/M2 | WEIGHT: 139 LBS | HEIGHT: 64 IN | TEMPERATURE: 97 F

## 2023-01-03 DIAGNOSIS — K21.00 ALKALINE REFLUX ESOPHAGITIS: ICD-10-CM

## 2023-01-03 DIAGNOSIS — K58.8 OTHER IRRITABLE BOWEL SYNDROME: ICD-10-CM

## 2023-01-03 DIAGNOSIS — K52.832 LYMPHOCYTIC COLITIS: ICD-10-CM

## 2023-01-03 PROBLEM — 1187071008: Status: ACTIVE | Noted: 2023-01-03

## 2023-01-03 PROBLEM — 266435005: Status: ACTIVE | Noted: 2023-01-03

## 2023-01-03 PROBLEM — 444702007: Status: ACTIVE | Noted: 2023-01-03

## 2023-01-03 PROBLEM — 10743008: Status: ACTIVE | Noted: 2023-01-03

## 2023-01-03 PROBLEM — 305058001: Status: ACTIVE | Noted: 2023-01-03

## 2023-01-03 PROCEDURE — 99213 OFFICE O/P EST LOW 20 MIN: CPT | Performed by: NURSE PRACTITIONER

## 2023-01-03 RX ORDER — CHOLECALCIFEROL (VITAMIN D3) 50 MCG
1 TABLET TABLET ORAL ONCE A DAY
Status: ACTIVE | COMMUNITY

## 2023-01-03 RX ORDER — AMLODIPINE BESYLATE 5 MG/1
1 TABLET TABLET ORAL ONCE A DAY
Status: ACTIVE | COMMUNITY

## 2023-01-03 RX ORDER — VITAMIN B COMPLEX
AS DIRECTED CAPSULE ORAL
Status: ACTIVE | COMMUNITY

## 2023-01-03 RX ORDER — OMEPRAZOLE 40 MG/1
1 CAPSULE CAPSULE, DELAYED RELEASE ORAL ONCE A DAY
Refills: 3 | Status: ACTIVE | COMMUNITY
Start: 2017-12-05

## 2023-01-03 RX ORDER — RALOXIFENE HCL 60 MG
1 TABLET TABLET ORAL ONCE A DAY
Refills: 0 | Status: ACTIVE | COMMUNITY

## 2023-01-03 RX ORDER — LEVOTHYROXINE SODIUM 0.05 MG/1
1 CAPSULE TABLET ORAL ONCE A DAY
Refills: 0 | Status: ACTIVE | COMMUNITY
Start: 2019-08-27

## 2023-01-03 RX ORDER — MELOXICAM 15 MG/1
1 TABLET TABLET ORAL ONCE A DAY
Status: ON HOLD | COMMUNITY

## 2023-01-03 RX ORDER — ESZOPICLONE 1 MG/1
1 TABLET IMMEDIATELY BEFORE BEDTIME TABLET, COATED ORAL ONCE A DAY
Status: ACTIVE | COMMUNITY

## 2023-01-03 RX ORDER — ROSUVASTATIN CALCIUM 5 MG
1 TABLET TABLET ORAL ONCE A DAY
Refills: 0 | Status: ACTIVE | COMMUNITY

## 2023-01-03 RX ORDER — OLMESARTAN MEDOXOMIL 20 MG/1
TAKE 1 TABLET DAILY TABLET, FILM COATED ORAL
Refills: 0 | Status: ACTIVE | COMMUNITY
Start: 2018-07-16

## 2023-01-03 NOTE — HPI-OTHER HISTORIES
Labs 12/14/2022:Urinalysis normal.  CBC: WBC 5.1, hemoglobin 9.8, MCV 90, platelet 265.  BMP normal with the exception of sodium 123, chloride 92, CO2 20, creatinine 1.1.  LFTs: TB 1.5, , ALT 25, AST 36.  Troponin less than 0.05.  Lactic acid 0.8.  Urine culture negative. CTA chest with contrast 12/14/2022:No pulmonary embolism or acute aortic abnormality.  Hyperenhancing mass in the posterior lateral right hepatic lobe most consistent with a hemangioma.  Subpleural increased reticulation throughout the lungs bilaterally.  This is suggestive of early fibrotic changes.  Small hiatal hernia.  Moderate multilevel degenerative disc changes of the thoracic spine.  Excessive kyphotic curvature of the thoracic spine.  Small hiatal hernia.

## 2023-01-03 NOTE — HPI-TODAY'S VISIT:
This is a 77-year-old female with a history of hypothyroidism, vitamin D deficiency, hyperlipidemia, leukopenia, hypertension, aortic regurgitation, SVT, osteopenia, Sjogren's, epigastric pain, GERD, red blood per rectum associated with hemorrhoids, chronic constipation, colon diverticulosis, left lower quadrant abdominal pain, and a diarrhea predominant change in bowel habits presenting for short interval follow-up. She was last seen 12/13/2022.  She reported diarrhea predominant change in bowel habits.  It was noted that she had a history of lymphocytic colitis.  Diarrhea had not been consistent.  She reported improvement with dietary modification and continue to experience intermittent periods of constipation.  At the time of her visit, she had not had a bowel movement in 3 days.  She continued to report left lower quadrant abdominal discomfort and was tender on exam.  History of diverticulosis was noted.  Diverticulitis was within the differential.  It was discussed that lymphocytic colitis was likely not playing a role as diarrhea symptoms have been inconsistent.  A functional bowel disorder was within the differential.  She was empirically treated for diverticulitis with Cipro and Flagyl.  She was to continue dicyclomine on an as-needed basis.  She was to begin Benefiber daily.  CT was a future consideration.  Chronic constipation was not usually controlled with MiraLAX.  She had discontinued MiraLAX when she began having diarrhea.  Because she was constipated at the time of her visit, she was instructed to restart daily MiraLAX if she had not had a bowel movement by the following day.  She was also to begin fiber as previously discussed. She called our office the following day reporting constipation for 5 days.  She also reported bloating.  She had tried fluid and MiraLAX and Benefiber.  She requested additional suggestions.  Her phone call was returned with recommendations.  She did not want to speak with that and they and requested to speak with a provider.  She did not recall her prior phone call and was in the emergency department at [facility] at the time of the return phone call.  She was called again 12/16/2022.  She reported that she had been to the emergency department because she felt unwell further describing weakness, palpitations, and shaking chills without fever as well as a sensation that she may faint.  The physician at the emergency department suggested a medication reaction.  Her evaluation was negative for infection and Cipro and Flagyl were discontinued.  She reported a small loose bowel movement the morning of the phone call after taking fiber and eating carrots.  She was to increase Benefiber to 1 tablespoon twice a day or 2 tablespoons daily and avoid dairy products, fatty food, and spicy food.  She was to continue align and take MiraLAX 1 capful daily unless she was experiencing diarrhea.  Records were requested from the emergency department. She states she is doing well.  She is no longer having abdominal pain.  When this was occurring, dicyclomine was effective in providing symptomatic relief.  She is taking Benefiber and MiraLAX daily.  She is also using align daily.  She is having regular bowel movements without diarrhea or constipation.  She denies any other abdominal symptoms.  She continues to avoid spicy food, greasy food, and dairy products.  She is interested in expanding her diet particularly restarting yogurt. She reports a history of pain indicated in the anterior sternal area.  She has undergone pulmonary, cardiac, and hematological evaluations.  She has been diagnosed with costochondritis and reports a recent negative "full skeletal survey" per oncology.  She has stopped taking meloxicam and also wants to restart  it.

## 2023-01-03 NOTE — HPI-OTHER HISTORIES
Labs 11/29/2022:CBC: WBC 3.9, hemoglobin 10.2, MCV 96.6, platelet 258.  BMP normal with exception of glucose 56, BUN 26.  LFTs normal TB 0.9, , ALT 22, AST 27. Colonoscopy 12/29/2021: BBPS 9 (Suprep), sigmoid and descending diverticulosis, moderate grade 1 nonbleeding internal hemorrhoids, otherwise normal to the terminal ileum. Random biopsies demonstrated lymphocytic colitis. recall set for 2031

## 2023-01-05 ENCOUNTER — ERX REFILL RESPONSE (OUTPATIENT)
Dept: URBAN - METROPOLITAN AREA CLINIC 113 | Facility: CLINIC | Age: 78
End: 2023-01-05

## 2023-01-05 RX ORDER — DICYCLOMINE HYDROCHLORIDE 10 MG/1
TAKE ONE CAPSULE BY MOUTH THREE TIMES A DAY AS NEEDED CAPSULE ORAL
Qty: 60 CAPSULE | Refills: 0 | OUTPATIENT

## 2023-03-24 ENCOUNTER — WEB ENCOUNTER (OUTPATIENT)
Dept: URBAN - METROPOLITAN AREA CLINIC 107 | Facility: CLINIC | Age: 78
End: 2023-03-24

## 2023-03-27 ENCOUNTER — TELEPHONE ENCOUNTER (OUTPATIENT)
Dept: URBAN - METROPOLITAN AREA CLINIC 113 | Facility: CLINIC | Age: 78
End: 2023-03-27

## 2023-04-03 LAB
CAMPYLOBACTER SPP. AG,EIA: (no result)
CLOSTRIDIUM DIFFICILE: (no result)
LEUKOCYTES: (no result)
OVA AND PARASITES, CONC AND PERM SMEAR: (no result)
SALMONELLA AND SHIGELLA, CULTURE: (no result)
SHIGA TOXINS, EIA W/RFL TO E.COLI O157 CULTURE: (no result)

## 2023-05-01 ENCOUNTER — OFFICE VISIT (OUTPATIENT)
Dept: URBAN - METROPOLITAN AREA CLINIC 113 | Facility: CLINIC | Age: 78
End: 2023-05-01
Payer: MEDICARE

## 2023-05-01 VITALS
BODY MASS INDEX: 23.39 KG/M2 | HEIGHT: 64 IN | TEMPERATURE: 97.1 F | RESPIRATION RATE: 18 BRPM | DIASTOLIC BLOOD PRESSURE: 81 MMHG | SYSTOLIC BLOOD PRESSURE: 144 MMHG | WEIGHT: 137 LBS | HEART RATE: 85 BPM

## 2023-05-01 DIAGNOSIS — R19.8 IRREGULAR BOWEL HABITS: ICD-10-CM

## 2023-05-01 DIAGNOSIS — K52.832 LYMPHOCYTIC COLITIS: ICD-10-CM

## 2023-05-01 DIAGNOSIS — K21.9 GASTROESOPHAGEAL REFLUX DISEASE WITHOUT ESOPHAGITIS: ICD-10-CM

## 2023-05-01 DIAGNOSIS — Z12.11 COLON CANCER SCREENING: ICD-10-CM

## 2023-05-01 DIAGNOSIS — K58.9 IRRITABLE BOWEL SYNDROME, UNSPECIFIED TYPE: ICD-10-CM

## 2023-05-01 PROCEDURE — 99214 OFFICE O/P EST MOD 30 MIN: CPT | Performed by: INTERNAL MEDICINE

## 2023-05-01 RX ORDER — LEVOTHYROXINE SODIUM 0.05 MG/1
1 CAPSULE TABLET ORAL ONCE A DAY
Refills: 0 | Status: ACTIVE | COMMUNITY
Start: 2019-08-27

## 2023-05-01 RX ORDER — ROSUVASTATIN CALCIUM 5 MG
1 TABLET TABLET ORAL ONCE A DAY
Refills: 0 | Status: ACTIVE | COMMUNITY

## 2023-05-01 RX ORDER — RALOXIFENE HCL 60 MG
1 TABLET TABLET ORAL ONCE A DAY
Refills: 0 | Status: ACTIVE | COMMUNITY

## 2023-05-01 RX ORDER — AMLODIPINE BESYLATE 5 MG/1
1 TABLET TABLET ORAL ONCE A DAY
Status: ACTIVE | COMMUNITY

## 2023-05-01 RX ORDER — ESZOPICLONE 1 MG/1
1 TABLET IMMEDIATELY BEFORE BEDTIME TABLET, COATED ORAL ONCE A DAY
Status: ON HOLD | COMMUNITY

## 2023-05-01 RX ORDER — VITAMIN B COMPLEX
AS DIRECTED CAPSULE ORAL
Status: ACTIVE | COMMUNITY

## 2023-05-01 RX ORDER — OLMESARTAN MEDOXOMIL 20 MG/1
TAKE 1 TABLET DAILY TABLET, FILM COATED ORAL
Refills: 0 | Status: ON HOLD | COMMUNITY
Start: 2018-07-16

## 2023-05-01 RX ORDER — DICYCLOMINE HYDROCHLORIDE 10 MG/1
TAKE ONE CAPSULE BY MOUTH THREE TIMES A DAY AS NEEDED CAPSULE ORAL
Qty: 60 CAPSULE | Refills: 0 | Status: ON HOLD | COMMUNITY

## 2023-05-01 RX ORDER — CHOLECALCIFEROL (VITAMIN D3) 50 MCG
1 TABLET TABLET ORAL ONCE A DAY
Status: ACTIVE | COMMUNITY

## 2023-05-01 RX ORDER — MELOXICAM 15 MG/1
1 TABLET TABLET ORAL ONCE A DAY
Status: ACTIVE | COMMUNITY

## 2023-05-01 RX ORDER — OMEPRAZOLE 40 MG/1
1 CAPSULE CAPSULE, DELAYED RELEASE ORAL ONCE A DAY
Refills: 3 | Status: ACTIVE | COMMUNITY
Start: 2017-12-05

## 2023-05-01 NOTE — HPI-TODAY'S VISIT:
This is a 77-year-old female with a history of hypothyroidism, vitamin D deficiency, hyperlipidemia, leukopenia, hypertension, aortic regurgitation, SVT, osteopenia, Sjogren's, epigastric pain, GERD, red blood per rectum associated with hemorrhoids, chronic constipation, colon diverticulosis, left lower quadrant abdominal pain, and a diarrhea predominant change in bowel habits presenting for follow-up. She was last seen 1/3/23 by Eusebia Perez.  When she was seen 12/13/2022, she reported a diarrhea-predominant change in bowel habits. She has a history of lymphocytic colitis.  Diarrhea has not been consistent.  She reported improvement with dietary modification and continue to experience intermittent periods of constipation.  At the time of her visit, she had not had a bowel movement in 3 days.  She continued to report left lower quadrant abdominal discomfort and was tender on exam.  History of diverticulosis was noted.  Diverticulitis was within the differential.  It was discussed that lymphocytic colitis was likely not playing a role as diarrhea symptoms have been inconsistent.  A functional bowel disorder was within the differential.  She was empirically treated for diverticulitis with Cipro and Flagyl.  She was to continue dicyclomine on an as-needed basis.  She was to begin Benefiber daily.  CT was a future consideration.  Chronic constipation was not usually controlled with MiraLAX.  She had discontinued MiraLAX when she began having diarrhea.  Because she was constipated at the time of her visit, she was instructed to restart daily MiraLAX if she had not had a bowel movement by the following day.  She was also to begin fiber as previously discussed.  She called our office the following day reporting constipation for 5 days.  She also reported bloating.  She had tried fluid and MiraLAX and Benefiber.  She requested additional suggestions.  Her phone call was returned with recommendations.  She did not want to speak with that and they and requested to speak with a provider.  She did not recall her prior phone call and was in the emergency department at [facility] at the time of the return phone call.  She was called again 12/16/2022.  She reported that she had been to the emergency department because she felt unwell further describing weakness, palpitations, and shaking chills without fever as well as a sensation that she may faint.  The physician at the emergency department suggested a medication reaction.  Her evaluation was negative for infection and Cipro and Flagyl were discontinued.  She reported a small loose bowel movement the morning of the phone call after taking fiber and eating carrots.  She was to increase Benefiber to 1 tablespoon twice a day or 2 tablespoons daily and avoid dairy products, fatty food, and spicy food.  She was to continue align and take MiraLAX 1 capful daily unless she was experiencing diarrhea.  Records were requested from the emergency department.  At her last office visit she was doing well.  She was no longer having abdominal pain.  When this was occurring, dicyclomine was effective in providing symptomatic relief.  She was taking Benefiber and MiraLAX daily.  She was also using align daily.  She was having regular bowel movements without diarrhea or constipation and denied any other abdominal symptoms.  She continued to avoid spicy food, greasy food, and dairy products.  She is interested in expanding her diet.  She reports a history of pain in the anterior sternal area.  She has undergone pulmonary, cardiac, and hematological evaluations.  She has been diagnosed with costochondritis and reports a recent negative "full skeletal survey" per oncology.  She has stopped taking meloxicam and wanted to restart  it.  After her last visit, the patient was placed on Benefiber and MiraLAX for IBS, omeprazole 40 mg daily for reflux, and was given the okay to restart meloxicam.  She took MiraLAX before January, but in January she began having loose stools.  She was placed on a brat diet which was of no help.  She was then treated with Cipro and Flagyl which made her ill, and she went to the emergency room.  Subsequently, the patient was placed on Benefiber, align and MiraLAX and her stools are back to normal.  In April, the patient began having loose stools and weight loss despite being off MiraLAX.  Stool cultures were negative.  She was placed on a low FODMAP diet and avoided lactose and gluten, and has been slightly better.  She stopped olmesartan thinking this might be a medication effect but this made no difference.  She has been having loose stools typically once a day on average without rectal bleeding, fever, chills, sweats.  She has had a 10 pound inadvertent weight loss.  She denies nausea, vomiting, or abdominal pain.  She has had no fever.  Her stools are semiformed at present.  She has noted increased intestinal gas and some bloating.  She is also noted lower extremity edema.  She is not taking MiraLAX currently.  She is only taking align and Benefiber.

## 2023-05-02 LAB
C-REACTIVE PROTEIN, QUANT: 1.4
HEMATOCRIT: 29.5
HEMOGLOBIN: 9.8
IMMUNOGLOBULIN A: 96
MCH: 31.1
MCHC: 33.2
MCV: 93.7
MPV: 10.3
PLATELET COUNT: 252
RDW: 12.5
RED BLOOD CELL COUNT: 3.15
SED RATE BY MODIFIED: 14
TISSUE TRANSGLUTAMINASE AB, IGA: <1
WHITE BLOOD CELL COUNT: 5.8

## 2023-05-11 ENCOUNTER — TELEPHONE ENCOUNTER (OUTPATIENT)
Dept: URBAN - METROPOLITAN AREA CLINIC 113 | Facility: CLINIC | Age: 78
End: 2023-05-11

## 2023-08-17 ENCOUNTER — OFFICE VISIT (OUTPATIENT)
Dept: URBAN - METROPOLITAN AREA CLINIC 113 | Facility: CLINIC | Age: 78
End: 2023-08-17
Payer: MEDICARE

## 2023-08-17 VITALS
SYSTOLIC BLOOD PRESSURE: 129 MMHG | WEIGHT: 142.2 LBS | HEIGHT: 64 IN | BODY MASS INDEX: 24.28 KG/M2 | HEART RATE: 94 BPM | TEMPERATURE: 97.1 F | RESPIRATION RATE: 18 BRPM | DIASTOLIC BLOOD PRESSURE: 96 MMHG

## 2023-08-17 DIAGNOSIS — K21.9 GASTROESOPHAGEAL REFLUX DISEASE WITHOUT ESOPHAGITIS: ICD-10-CM

## 2023-08-17 DIAGNOSIS — Z12.11 COLON CANCER SCREENING: ICD-10-CM

## 2023-08-17 DIAGNOSIS — K58.8 OTHER IRRITABLE BOWEL SYNDROME: ICD-10-CM

## 2023-08-17 PROCEDURE — 99214 OFFICE O/P EST MOD 30 MIN: CPT | Performed by: INTERNAL MEDICINE

## 2023-08-17 RX ORDER — OCTISALATE, AVOBENZONE, HOMOSALATE, AND OCTOCRYLENE 29.4; 29.4; 49; 25.48 MG/ML; MG/ML; MG/ML; MG/ML
AS DIRECTED LOTION TOPICAL
Status: ACTIVE | COMMUNITY

## 2023-08-17 RX ORDER — CHOLECALCIFEROL (VITAMIN D3) 50 MCG
1 TABLET TABLET ORAL ONCE A DAY
Status: ACTIVE | COMMUNITY

## 2023-08-17 RX ORDER — ROSUVASTATIN CALCIUM 5 MG
1 TABLET TABLET ORAL ONCE A DAY
Refills: 0 | Status: ACTIVE | COMMUNITY

## 2023-08-17 RX ORDER — DICYCLOMINE HYDROCHLORIDE 10 MG/1
TAKE ONE CAPSULE BY MOUTH THREE TIMES A DAY AS NEEDED CAPSULE ORAL
Qty: 60 CAPSULE | Refills: 0 | Status: ON HOLD | COMMUNITY

## 2023-08-17 RX ORDER — LEVOTHYROXINE SODIUM 0.05 MG/1
1 CAPSULE TABLET ORAL ONCE A DAY
Refills: 0 | Status: ACTIVE | COMMUNITY
Start: 2019-08-27

## 2023-08-17 RX ORDER — VITAMIN B COMPLEX
AS DIRECTED CAPSULE ORAL
Status: ACTIVE | COMMUNITY

## 2023-08-17 RX ORDER — VALSARTAN 80 MG/1
1 TABLET TABLET, FILM COATED ORAL ONCE A DAY
Status: ACTIVE | COMMUNITY

## 2023-08-17 RX ORDER — AMLODIPINE BESYLATE 5 MG/1
1 TABLET TABLET ORAL ONCE A DAY
Status: ON HOLD | COMMUNITY

## 2023-08-17 RX ORDER — ESZOPICLONE 1 MG/1
1 TABLET IMMEDIATELY BEFORE BEDTIME TABLET, COATED ORAL ONCE A DAY
Status: ON HOLD | COMMUNITY

## 2023-08-17 RX ORDER — OMEPRAZOLE 40 MG/1
1 CAPSULE CAPSULE, DELAYED RELEASE ORAL ONCE A DAY
Refills: 3 | Status: ACTIVE | COMMUNITY
Start: 2017-12-05

## 2023-08-17 RX ORDER — RALOXIFENE HCL 60 MG
1 TABLET TABLET ORAL ONCE A DAY
Refills: 0 | Status: ACTIVE | COMMUNITY

## 2023-08-17 RX ORDER — OLMESARTAN MEDOXOMIL 20 MG/1
TAKE 1 TABLET DAILY TABLET, FILM COATED ORAL
Refills: 0 | Status: ON HOLD | COMMUNITY
Start: 2018-07-16

## 2023-08-17 RX ORDER — MELOXICAM 15 MG/1
1 TABLET TABLET ORAL ONCE A DAY
Status: ACTIVE | COMMUNITY

## 2023-08-17 NOTE — HPI-TODAY'S VISIT:
This is a 77-year-old female with a history of hypothyroidism, vitamin D deficiency, hyperlipidemia, leukopenia, hypertension, aortic regurgitation, SVT, osteopenia, Sjogren's, epigastric pain, GERD, red blood per rectum associated with hemorrhoids, chronic constipation, colon diverticulosis, left lower quadrant abdominal pain, and a diarrhea predominant change in bowel habits presenting for follow-up. She was last seen on 5/1/23.  When she was seen 12/13/2022, she reported a diarrhea-predominant change in bowel habits. She has a history of lymphocytic colitis.  Diarrhea has not been consistent.  She reported improvement with dietary modification and continue to experience intermittent periods of constipation.  At the time of her visit, she had not had a bowel movement in 3 days.  She continued to report left lower quadrant abdominal discomfort and was tender on exam.  History of diverticulosis was noted.  Diverticulitis was within the differential.  It was discussed that lymphocytic colitis was likely not playing a role as diarrhea symptoms have been inconsistent.  A functional bowel disorder was within the differential.  She was empirically treated for diverticulitis with Cipro and Flagyl.  She was to continue dicyclomine on an as-needed basis.  She was to begin Benefiber daily.  CT was a future consideration.  Chronic constipation was not usually controlled with MiraLAX.  She had discontinued MiraLAX when she began having diarrhea.  Because she was constipated at the time of her visit, she was instructed to restart daily MiraLAX if she had not had a bowel movement by the following day.  She was also to begin fiber as previously discussed.  She called our office the following day reporting constipation for 5 days.  She also reported bloating.  She had tried fluid and MiraLAX and Benefiber.  She requested additional suggestions.  Her phone call was returned with recommendations.  She did not want to speak with that and they and requested to speak with a provider.  She did not recall her prior phone call and was in the emergency department at [facility] at the time of the return phone call.  She was called again 12/16/2022.  She reported that she had been to the emergency department because she felt unwell further describing weakness, palpitations, and shaking chills without fever as well as a sensation that she may faint.  The physician at the emergency department suggested a medication reaction.  Her evaluation was negative for infection and Cipro and Flagyl were discontinued.  She reported a small loose bowel movement the morning of the phone call after taking fiber and eating carrots.  She was to increase Benefiber to 1 tablespoon twice a day or 2 tablespoons daily and avoid dairy products, fatty food, and spicy food.  She was to continue align and take MiraLAX 1 capful daily unless she was experiencing diarrhea.  Records were requested from the emergency department.  At her 1/3/23 office visit she was doing well.  She was no longer having abdominal pain.  When this was occurring, dicyclomine was effective in providing symptomatic relief.  She was taking Benefiber and MiraLAX daily.  She was also using Align daily.  She was having regular bowel movements without diarrhea or constipation and denied any other abdominal symptoms.  She continued to avoid spicy food, greasy food, and dairy products.  She was interested in expanding her diet.  She reported a history of pain in the anterior sternal area.  She had undergone pulmonary, cardiac, and hematological evaluations.  She had been diagnosed with costochondritis and reports a recent negative "full skeletal survey" per oncology.  She hds stopped taking meloxicam and wanted to restart  it.  After her 1/3/23 visit, the patient was placed on Benefiber and MiraLAX for IBS, omeprazole 40 mg daily for reflux, and was given the okay to restart meloxicam.  She took MiraLAX before January, but in January she began having loose stools.  She was placed on a BRAT diet which was of no help.  She was then treated with Cipro and Flagyl which made her ill, and she went to the emergency room.  Subsequently, the patient was placed on Benefiber, Align and MiraLAX and her stools returned to normal.  In April 2023, the patient began having loose stools and weight loss despite being off MiraLAX.  Stool cultures were negative.  She was placed on a low FODMAP diet and avoided lactose and gluten, and has been slightly better.  She stopped olmesartan thinking this might be a medication effect but this made no difference.  At the time of her 5/1/23 office visit, she was having loose stools typically once a day on average without rectal bleeding, fever, chills, sweats.  She had a 10 pound inadvertent weight loss.  She denies nausea, vomiting, or abdominal pain.  She had no fever.  Her stools were semiformed.  She noted increased intestinal gas and some bloating as well as lower extremity edema.  She was not taking MiraLAX, only taking align and Benefiber.  After the 5/1/23 office visit, she was checked for celiac sprue (negative), as well as inflammatory markers (ESR 14, CRP 1.4). A cbc was checked showing mild anemia (WBC 5.8, HgB 9.8, HCt 29.5, platelets 252K, MCV 94).  She was continued on Benefiber and Align and told to liberalize her diet.  The patient returns today doing "pretty good."  She is on Benefiber, align, and taking a half dose of MiraLAX daily, and states that her symptoms are stable.  She has had what she terms "no extraordinary events," and has been able to liberalize her diet without difficulty.

## 2024-02-15 ENCOUNTER — OV EP (OUTPATIENT)
Dept: URBAN - METROPOLITAN AREA CLINIC 113 | Facility: CLINIC | Age: 79
End: 2024-02-15
Payer: MEDICARE

## 2024-02-15 VITALS
HEART RATE: 72 BPM | HEIGHT: 64 IN | TEMPERATURE: 97.1 F | DIASTOLIC BLOOD PRESSURE: 85 MMHG | RESPIRATION RATE: 16 BRPM | BODY MASS INDEX: 23.39 KG/M2 | SYSTOLIC BLOOD PRESSURE: 160 MMHG | WEIGHT: 137 LBS

## 2024-02-15 DIAGNOSIS — K58.0 IRRITABLE BOWEL SYNDROME WITH DIARRHEA: ICD-10-CM

## 2024-02-15 PROBLEM — 197125005: Status: ACTIVE | Noted: 2024-02-15

## 2024-02-15 PROCEDURE — 99214 OFFICE O/P EST MOD 30 MIN: CPT | Performed by: NURSE PRACTITIONER

## 2024-02-15 RX ORDER — VALSARTAN 80 MG/1
1 TABLET TABLET, FILM COATED ORAL ONCE A DAY
Status: ACTIVE | COMMUNITY

## 2024-02-15 RX ORDER — RALOXIFENE HCL 60 MG
1 TABLET TABLET ORAL ONCE A DAY
Refills: 0 | Status: ACTIVE | COMMUNITY

## 2024-02-15 RX ORDER — OCTISALATE, AVOBENZONE, HOMOSALATE, AND OCTOCRYLENE 29.4; 29.4; 49; 25.48 MG/ML; MG/ML; MG/ML; MG/ML
AS DIRECTED LOTION TOPICAL
Status: ACTIVE | COMMUNITY

## 2024-02-15 RX ORDER — LEVOTHYROXINE SODIUM 0.05 MG/1
1 CAPSULE TABLET ORAL ONCE A DAY
Refills: 0 | Status: ACTIVE | COMMUNITY
Start: 2019-08-27

## 2024-02-15 RX ORDER — OMEPRAZOLE 40 MG/1
1 CAPSULE CAPSULE, DELAYED RELEASE ORAL ONCE A DAY
Refills: 3 | Status: ACTIVE | COMMUNITY
Start: 2017-12-05

## 2024-02-15 RX ORDER — ESZOPICLONE 1 MG/1
1 TABLET IMMEDIATELY BEFORE BEDTIME TABLET, COATED ORAL ONCE A DAY
Status: ON HOLD | COMMUNITY

## 2024-02-15 RX ORDER — ROSUVASTATIN CALCIUM 5 MG
1 TABLET TABLET ORAL ONCE A DAY
Refills: 0 | Status: ACTIVE | COMMUNITY

## 2024-02-15 RX ORDER — VITAMIN B COMPLEX
AS DIRECTED CAPSULE ORAL
Status: ACTIVE | COMMUNITY

## 2024-02-15 RX ORDER — AMLODIPINE BESYLATE 5 MG/1
1 TABLET TABLET ORAL ONCE A DAY
Status: ON HOLD | COMMUNITY

## 2024-02-15 RX ORDER — RIFAXIMIN 550 MG/1
1 TABLET TABLET ORAL THREE TIMES A DAY
Qty: 42 TABLET | Refills: 2 | OUTPATIENT
Start: 2024-02-15 | End: 2024-03-28

## 2024-02-15 RX ORDER — OLMESARTAN MEDOXOMIL 20 MG/1
TAKE 1 TABLET DAILY TABLET, FILM COATED ORAL
Refills: 0 | Status: ON HOLD | COMMUNITY
Start: 2018-07-16

## 2024-02-15 RX ORDER — DICYCLOMINE HYDROCHLORIDE 10 MG/1
TAKE ONE CAPSULE BY MOUTH THREE TIMES A DAY AS NEEDED CAPSULE ORAL
Qty: 60 CAPSULE | Refills: 0 | Status: ON HOLD | COMMUNITY

## 2024-02-15 RX ORDER — CHOLECALCIFEROL (VITAMIN D3) 50 MCG
1 TABLET TABLET ORAL ONCE A DAY
Status: ACTIVE | COMMUNITY

## 2024-02-15 RX ORDER — MELOXICAM 15 MG/1
1 TABLET TABLET ORAL ONCE A DAY
Status: ACTIVE | COMMUNITY

## 2024-02-15 NOTE — HPI-TODAY'S VISIT:
This is a 78-year-old female with a history of hypothyroidism, vitamin D deficiency, hyperlipidemia, leukopenia, hypertension, aortic regurgitation, SVT, osteopenia, Sjogren's, epigastric pain, GERD, red blood per rectum associated with hemorrhoids,, chronic constipation, colon diverticulosis, left lower quadrant abdominal pain and diarrhea predominant change in bowel habits and lymphocytic colitis presenting for follow-up. She was last seen in the office 8/17/2023.  She was taking Benefiber, align, and a half dose of MiraLAX daily due to constipation.  She reported stable symptoms.  She has been able to relax her diet.  It was discussed that her symptoms were associated with IBS.  Testing for organic disease was negative.  She was to continue her current regimen coupled with dietary modification.  Colon cancer screening was up-to-date and due in 2031.  It was discussed there was a possibility that lymphocytic colitis may be contributing to episodic loose stools. She reports her bowel habits have been perfect until 6 weeks ago.  She began to have excessive gas, multiple loose stools per day, and bloating.  She tried modifying FODMAPs in her diet and this was ineffective.  She was taking multiple over-the-counter supplements and discontinued them a week ago.  She is also stopped MiraLAX but continue Benefiber and align daily.  She reports some improvement in stool consistency and frequency has improved.  She is having 1, occasionally 2 stools per day that are unformed.  She is otherwise doing well. 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  She is scheduled for surgical resection of this lesion 3/14/2024.

## 2024-06-17 ENCOUNTER — OFFICE VISIT (OUTPATIENT)
Dept: URBAN - METROPOLITAN AREA CLINIC 113 | Facility: CLINIC | Age: 79
End: 2024-06-17

## 2024-10-24 ENCOUNTER — DASHBOARD ENCOUNTERS (OUTPATIENT)
Age: 79
End: 2024-10-24

## 2024-10-24 ENCOUNTER — OFFICE VISIT (OUTPATIENT)
Dept: URBAN - METROPOLITAN AREA CLINIC 113 | Facility: CLINIC | Age: 79
End: 2024-10-24
Payer: MEDICARE

## 2024-10-24 VITALS
HEIGHT: 64 IN | TEMPERATURE: 97.7 F | DIASTOLIC BLOOD PRESSURE: 89 MMHG | SYSTOLIC BLOOD PRESSURE: 133 MMHG | BODY MASS INDEX: 24.79 KG/M2 | WEIGHT: 145.2 LBS | HEART RATE: 90 BPM | RESPIRATION RATE: 18 BRPM

## 2024-10-24 DIAGNOSIS — K64.8 INTERNAL HEMORRHOIDS: ICD-10-CM

## 2024-10-24 DIAGNOSIS — K62.5 BRIGHT RED BLOOD PER RECTUM: ICD-10-CM

## 2024-10-24 DIAGNOSIS — R19.8 IRREGULAR BOWEL HABITS: ICD-10-CM

## 2024-10-24 PROCEDURE — 99213 OFFICE O/P EST LOW 20 MIN: CPT | Performed by: NURSE PRACTITIONER

## 2024-10-24 RX ORDER — LEVOTHYROXINE SODIUM 0.05 MG/1
1 CAPSULE TABLET ORAL ONCE A DAY
Refills: 0 | Status: ACTIVE | COMMUNITY
Start: 2019-08-27

## 2024-10-24 RX ORDER — OMEPRAZOLE 20 MG/1
1 CAPSULE CAPSULE, DELAYED RELEASE ORAL ONCE A DAY
Refills: 3 | Status: ACTIVE | COMMUNITY
Start: 2017-12-05

## 2024-10-24 RX ORDER — VALSARTAN 160 MG/1
1 TABLET TABLET, FILM COATED ORAL ONCE A DAY
Status: ACTIVE | COMMUNITY

## 2024-10-24 RX ORDER — MELOXICAM 15 MG/1
1 TABLET TABLET ORAL ONCE A DAY
Status: ON HOLD | COMMUNITY

## 2024-10-24 RX ORDER — OCTISALATE, AVOBENZONE, HOMOSALATE, AND OCTOCRYLENE 29.4; 29.4; 49; 25.48 MG/ML; MG/ML; MG/ML; MG/ML
AS DIRECTED LOTION TOPICAL
Status: ACTIVE | COMMUNITY

## 2024-10-24 RX ORDER — VITAMIN B COMPLEX
AS DIRECTED CAPSULE ORAL
Status: ACTIVE | COMMUNITY

## 2024-10-24 RX ORDER — ROSUVASTATIN CALCIUM 5 MG
1 TABLET TABLET ORAL ONCE A DAY
Refills: 0 | Status: ACTIVE | COMMUNITY

## 2024-10-24 RX ORDER — CHOLECALCIFEROL (VITAMIN D3) 50 MCG
1 TABLET TABLET ORAL ONCE A DAY
Status: ACTIVE | COMMUNITY

## 2024-10-24 RX ORDER — OLMESARTAN MEDOXOMIL 20 MG/1
TAKE 1 TABLET DAILY TABLET, FILM COATED ORAL
Refills: 0 | Status: ON HOLD | COMMUNITY
Start: 2018-07-16

## 2024-10-24 RX ORDER — METOPROLOL TARTRATE 25 MG/1
1 TABLET WITH FOOD TABLET, FILM COATED ORAL TWICE A DAY
Status: ACTIVE | COMMUNITY

## 2024-10-24 RX ORDER — ESZOPICLONE 1 MG/1
1 TABLET IMMEDIATELY BEFORE BEDTIME TABLET, COATED ORAL ONCE A DAY
Status: ON HOLD | COMMUNITY

## 2024-10-24 RX ORDER — DICYCLOMINE HYDROCHLORIDE 10 MG/1
TAKE ONE CAPSULE BY MOUTH THREE TIMES A DAY AS NEEDED CAPSULE ORAL
Qty: 60 CAPSULE | Refills: 0 | Status: ON HOLD | COMMUNITY

## 2024-10-24 RX ORDER — AMLODIPINE BESYLATE 5 MG/1
1 TABLET TABLET ORAL ONCE A DAY
Status: ON HOLD | COMMUNITY

## 2024-10-24 RX ORDER — RALOXIFENE HCL 60 MG
1 TABLET TABLET ORAL ONCE A DAY
Refills: 0 | Status: ACTIVE | COMMUNITY

## 2024-10-24 NOTE — HPI-TODAY'S VISIT:
This is a 78-year-old female with a history of hypothyroidism, vitamin D deficiency, hyperlipidemia, leukopenia, hypertension, aortic regurgitation, SVT, osteopenia, Sjogren's, epigastric pain, GERD, hemorrhoids, chronic constipation, colon diverticulosis, left lower quadrant abdominal pain and diarrhea predominant change in bowel habits associated with IBS, and lymphocytic colitis presenting for follow-up. She was last seen 2/15/2024.  She reported a recent exacerbation of diarrhea predominant IBS.  She had surgery the following month and was hoping that her bowel habits would improve prior to.  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  She was to continue daily fiber and hold align while taking Xifaxan and resume it afterward.  She was to restart MiraLAX if constipation recurred. Co-pay for Xifaxan was $914.  Her prescription was placed on hold per specialty pharmacy. She reports her bowel habits are cyclical.  She typically takes Benefiber, MiraLAX, and align on a daily basis.  She has regular bowel movements and is without other symptoms for 5 to 6 months.  Then, for no reason, she has an "IBS flare" characterized by frequent postprandial diarrhea.  She will hold MiraLAX.  Diarrhea will persist for 2 weeks.  She will gradually restart MiraLAX.  Just after diarrhea episodes, she begins to have red blood per rectum without proctalgia or straining.  This will last for 10 to 14 days and then resolve.  She attributes this to internal hemorrhoids.

## 2024-12-30 ENCOUNTER — WEB ENCOUNTER (OUTPATIENT)
Dept: URBAN - METROPOLITAN AREA CLINIC 113 | Facility: CLINIC | Age: 79
End: 2024-12-30

## 2024-12-31 ENCOUNTER — WEB ENCOUNTER (OUTPATIENT)
Dept: URBAN - METROPOLITAN AREA CLINIC 113 | Facility: CLINIC | Age: 79
End: 2024-12-31

## 2025-01-08 ENCOUNTER — WEB ENCOUNTER (OUTPATIENT)
Dept: URBAN - METROPOLITAN AREA CLINIC 113 | Facility: CLINIC | Age: 80
End: 2025-01-08

## 2025-01-08 RX ORDER — RIFAXIMIN 550 MG/1
1 TABLET TABLET ORAL THREE TIMES A DAY
Qty: 42 TABLET | Refills: 3 | OUTPATIENT
Start: 2025-01-09 | End: 2025-03-06

## 2025-01-09 ENCOUNTER — WEB ENCOUNTER (OUTPATIENT)
Dept: URBAN - METROPOLITAN AREA CLINIC 113 | Facility: CLINIC | Age: 80
End: 2025-01-09

## 2025-01-15 ENCOUNTER — TELEPHONE ENCOUNTER (OUTPATIENT)
Dept: URBAN - METROPOLITAN AREA CLINIC 113 | Facility: CLINIC | Age: 80
End: 2025-01-15

## 2025-01-17 ENCOUNTER — WEB ENCOUNTER (OUTPATIENT)
Dept: URBAN - METROPOLITAN AREA CLINIC 113 | Facility: CLINIC | Age: 80
End: 2025-01-17

## 2025-03-21 ENCOUNTER — LAB OUTSIDE AN ENCOUNTER (OUTPATIENT)
Dept: URBAN - METROPOLITAN AREA CLINIC 113 | Facility: CLINIC | Age: 80
End: 2025-03-21

## 2025-03-21 ENCOUNTER — OFFICE VISIT (OUTPATIENT)
Dept: URBAN - METROPOLITAN AREA CLINIC 113 | Facility: CLINIC | Age: 80
End: 2025-03-21
Payer: MEDICARE

## 2025-03-21 DIAGNOSIS — R19.7 DIARRHEA, UNSPECIFIED TYPE: ICD-10-CM

## 2025-03-21 DIAGNOSIS — R74.8 ELEVATED LIVER ENZYMES: ICD-10-CM

## 2025-03-21 PROBLEM — 707724006: Status: ACTIVE | Noted: 2025-03-21

## 2025-03-21 PROBLEM — 62315008: Status: ACTIVE | Noted: 2025-03-21

## 2025-03-21 PROCEDURE — 99214 OFFICE O/P EST MOD 30 MIN: CPT | Performed by: NURSE PRACTITIONER

## 2025-03-21 RX ORDER — ROSUVASTATIN CALCIUM 5 MG
1 TABLET TABLET ORAL ONCE A DAY
Refills: 0 | Status: ACTIVE | COMMUNITY

## 2025-03-21 RX ORDER — MELOXICAM 15 MG/1
1 TABLET TABLET ORAL ONCE A DAY
Status: ON HOLD | COMMUNITY

## 2025-03-21 RX ORDER — DICYCLOMINE HYDROCHLORIDE 10 MG/1
TAKE ONE CAPSULE BY MOUTH THREE TIMES A DAY AS NEEDED CAPSULE ORAL
Qty: 60 CAPSULE | Refills: 0 | Status: ON HOLD | COMMUNITY

## 2025-03-21 RX ORDER — ESZOPICLONE 1 MG/1
1 TABLET IMMEDIATELY BEFORE BEDTIME TABLET, COATED ORAL ONCE A DAY
Status: ON HOLD | COMMUNITY

## 2025-03-21 RX ORDER — AMLODIPINE BESYLATE 5 MG/1
1 TABLET TABLET ORAL ONCE A DAY
Status: ON HOLD | COMMUNITY

## 2025-03-21 RX ORDER — VITAMIN B COMPLEX
AS DIRECTED CAPSULE ORAL
Status: ON HOLD | COMMUNITY

## 2025-03-21 RX ORDER — OLMESARTAN MEDOXOMIL 20 MG/1
TAKE 1 TABLET DAILY TABLET, FILM COATED ORAL
Refills: 0 | Status: ON HOLD | COMMUNITY
Start: 2018-07-16

## 2025-03-21 RX ORDER — CHOLECALCIFEROL (VITAMIN D3) 50 MCG
1 TABLET TABLET ORAL ONCE A DAY
Status: ON HOLD | COMMUNITY

## 2025-03-21 RX ORDER — OCTISALATE, AVOBENZONE, HOMOSALATE, AND OCTOCRYLENE 29.4; 29.4; 49; 25.48 MG/ML; MG/ML; MG/ML; MG/ML
AS DIRECTED LOTION TOPICAL
Status: ON HOLD | COMMUNITY

## 2025-03-21 RX ORDER — LEVOTHYROXINE SODIUM 0.05 MG/1
1 CAPSULE TABLET ORAL ONCE A DAY
Refills: 0 | Status: ACTIVE | COMMUNITY
Start: 2019-08-27

## 2025-03-21 RX ORDER — METOPROLOL TARTRATE 25 MG/1
1 TABLET WITH FOOD TABLET, FILM COATED ORAL TWICE A DAY
Status: ACTIVE | COMMUNITY

## 2025-03-21 RX ORDER — VALSARTAN 160 MG/1
1 TABLET TABLET, FILM COATED ORAL ONCE A DAY
Status: ACTIVE | COMMUNITY

## 2025-03-21 RX ORDER — OMEPRAZOLE 20 MG/1
1 CAPSULE CAPSULE, DELAYED RELEASE ORAL ONCE A DAY
Refills: 3 | Status: ACTIVE | COMMUNITY
Start: 2017-12-05

## 2025-03-21 RX ORDER — RALOXIFENE HCL 60 MG
1 TABLET TABLET ORAL ONCE A DAY
Refills: 0 | Status: ACTIVE | COMMUNITY

## 2025-03-21 NOTE — HPI-TODAY'S VISIT:
This is a 79-year-old female with a history of hypothyroidism, vitamin D deficiency, hyperlipidemia, leukopenia, hypertension, aortic regurgitation, SVT, osteopenia, Sjogren's, epigastric pain, GERD, hemorrhoids, chronic constipation, colon diverticulosis, left lower quadrant abdominal pain and diarrhea predominant change in bowel habits associated with IBS, and lymphocytic colitis presenting for follow-up. She was last seen in the office 10/24/2024.  She had irregular bowel habits associated with a functional bowel disorder.  She was constipated at baseline and manage symptoms with daily MiraLAX, Benefiber, align.  Every 5 to 6 months, she had diarrhea.  A trial of Xifaxan was recommended at her last visit but was cost prohibitive.  She was allergic to Flagyl.  When symptoms recurred, we were to consider a trial of amoxicillin per Dr. Ruiz to treat small intestinal bacterial overgrowth.  She reported red blood per rectum.  It was likely associated with internal hemorrhoids.  She was to continue daily fiber.  She was to use Preparation H suppositories at that time for 7 days.  If that was ineffective, she was to call for a prescription for Anusol.  She states she is here for 2 issues.  Firstly, she reports intermittent liver enzyme elevation for at least a year.  This has been trending upward.  She had an ultrasound that showed a possible fatty liver.  Secondly, she has experienced a change in bowel habits over the last 3 weeks toward diarrhea.  At baseline, she has intermittent diarrhea that lasts days or 1 or 2 weeks and will resolve for a period of time and recur.  For the last 2 or 3 weeks, she reports loose or watery bowel movements after every meal.  This is infrequently urgent.  She reports excessive abdominal "rumbling" but denies pain or cramping.  She has not been able to identify dietary triggers.  If she is awakened to urinate at night, she may have a bowel movement.  She continues to take fiber 2 tablespoons daily.  She denies regular use of dairy products.  She denies bleeding or any other abdominal symptoms.  She was prescribed Cymbalta 2 months ago to treat body aches associated with Sjogren's.  She denies recent antibiotic use.  Labs 3/4/2025 CBC: WBC 5.5, hemoglobin 11.4, MCV 96.7, platelet 234.  BMP normal with exception of sodium 133, creatinine 1.14.  LFTs: TB 0.7, , ALT 40, AST 64.  Lipid panel normal.  Vitamin D 25-hydroxy 32.  TSH 2.31.  Abdominal ultrasound 3/17/2025: Liver is slightly echogenic suggesting  mild fatty replacement.  Otherwise negative study.

## 2025-03-21 NOTE — HPI-OTHER HISTORIES
Labs 3/4/2025 CBC: WBC 5.5, hemoglobin 11.4, MCV 96.7, platelet 234. BMP normal with exception of sodium 133, creatinine 1.14. LFTs: TB 0.7, , ALT 40, AST 64. Lipid panel normal. Vitamin D 25-hydroxy 32. TSH 2.31.  Abdominal ultrasound 3/17/2025: Liver is slightly echogenic suggesting mild fatty replacement. Otherwise negative study.  Labs 12/14/2022:Urinalysis normal.  CBC: WBC 5.1, hemoglobin 9.8, MCV 90, platelet 265.  BMP normal with the exception of sodium 123, chloride 92, CO2 20, creatinine 1.1.  LFTs: TB 1.5, , ALT 25, AST 36.  Troponin less than 0.05.  Lactic acid 0.8.  Urine culture negative. CTA chest with contrast 12/14/2022:No pulmonary embolism or acute aortic abnormality.  Hyperenhancing mass in the posterior lateral right hepatic lobe most consistent with a hemangioma.  Subpleural increased reticulation throughout the lungs bilaterally.  This is suggestive of early fibrotic changes.  Small hiatal hernia.  Moderate multilevel degenerative disc changes of the thoracic spine.  Excessive kyphotic curvature of the thoracic spine.  Small hiatal hernia.

## 2025-03-26 ENCOUNTER — WEB ENCOUNTER (OUTPATIENT)
Dept: URBAN - METROPOLITAN AREA CLINIC 113 | Facility: CLINIC | Age: 80
End: 2025-03-26

## 2025-03-26 LAB
ACTIN (SMOOTH MUSCLE) ANTIBODY (IGG): <20
ANA SCREEN, IFA: POSITIVE
FERRITIN, SERUM: 138
HEPATITIS A AB, TOTAL: (no result)
HEPATITIS B CORE AB TOTAL: (no result)
HEPATITIS B SURFACE AB IMMUNITY, QN: <5
HEPATITIS B SURFACE ANTIGEN: (no result)
HEPATITIS C ANTIBODY: (no result)
IRON BIND.CAP.(TIBC): 262
IRON SATURATION: 27
IRON: 71
MITOCHONDRIAL (M2) ANTIBODY: 141.6

## 2025-03-26 RX ORDER — BUDESONIDE 3 MG/1
3 CAPSULES CAPSULE, DELAYED RELEASE PELLETS ORAL ONCE A DAY
Qty: 90 CAPSULE | Refills: 3 | OUTPATIENT
Start: 2025-03-27

## 2025-03-26 RX ORDER — URSODIOL 300 MG/1
1 CAPSULE CAPSULE ORAL TWICE A DAY
Qty: 60 | Refills: 3 | OUTPATIENT
Start: 2025-03-27

## 2025-03-28 ENCOUNTER — TELEPHONE ENCOUNTER (OUTPATIENT)
Dept: URBAN - METROPOLITAN AREA CLINIC 113 | Facility: CLINIC | Age: 80
End: 2025-03-28

## 2025-03-28 ENCOUNTER — OFFICE VISIT (OUTPATIENT)
Dept: URBAN - METROPOLITAN AREA CLINIC 112 | Facility: CLINIC | Age: 80
End: 2025-03-28

## 2025-03-28 LAB
CAMPYLOBACTER SPP. AG,EIA: (no result)
LACTOFERRIN, FECAL, QUANT.: 45.6
OVA AND PARASITES, CONC AND PERM SMEAR: (no result)
SALMONELLA AND SHIGELLA, CULTURE: (no result)
SHIGA TOXINS, EIA W/RFL TO E.COLI O157 CULTURE: (no result)

## 2025-03-28 RX ORDER — BUDESONIDE 3 MG/1
3 CAPSULES CAPSULE, DELAYED RELEASE PELLETS ORAL ONCE A DAY
Qty: 90 CAPSULE | Refills: 3 | Status: ACTIVE | COMMUNITY
Start: 2025-03-27

## 2025-03-28 RX ORDER — METOPROLOL TARTRATE 25 MG/1
1 TABLET WITH FOOD TABLET, FILM COATED ORAL TWICE A DAY
Status: ACTIVE | COMMUNITY

## 2025-03-28 RX ORDER — ROSUVASTATIN CALCIUM 5 MG
1 TABLET TABLET ORAL ONCE A DAY
Refills: 0 | Status: ACTIVE | COMMUNITY

## 2025-03-28 RX ORDER — AMLODIPINE BESYLATE 5 MG/1
1 TABLET TABLET ORAL ONCE A DAY
Status: ON HOLD | COMMUNITY

## 2025-03-28 RX ORDER — CHOLECALCIFEROL (VITAMIN D3) 50 MCG
1 TABLET TABLET ORAL ONCE A DAY
Status: ON HOLD | COMMUNITY

## 2025-03-28 RX ORDER — VITAMIN B COMPLEX
AS DIRECTED CAPSULE ORAL
Status: ON HOLD | COMMUNITY

## 2025-03-28 RX ORDER — DICYCLOMINE HYDROCHLORIDE 10 MG/1
TAKE ONE CAPSULE BY MOUTH THREE TIMES A DAY AS NEEDED CAPSULE ORAL
Qty: 60 CAPSULE | Refills: 0 | Status: ON HOLD | COMMUNITY

## 2025-03-28 RX ORDER — OCTISALATE, AVOBENZONE, HOMOSALATE, AND OCTOCRYLENE 29.4; 29.4; 49; 25.48 MG/ML; MG/ML; MG/ML; MG/ML
AS DIRECTED LOTION TOPICAL
Status: ON HOLD | COMMUNITY

## 2025-03-28 RX ORDER — OLMESARTAN MEDOXOMIL 20 MG/1
TAKE 1 TABLET DAILY TABLET, FILM COATED ORAL
Refills: 0 | Status: ON HOLD | COMMUNITY
Start: 2018-07-16

## 2025-03-28 RX ORDER — ESZOPICLONE 1 MG/1
1 TABLET IMMEDIATELY BEFORE BEDTIME TABLET, COATED ORAL ONCE A DAY
Status: ON HOLD | COMMUNITY

## 2025-03-28 RX ORDER — URSODIOL 300 MG/1
1 CAPSULE CAPSULE ORAL TWICE A DAY
Qty: 60 | Refills: 3 | Status: ACTIVE | COMMUNITY
Start: 2025-03-27

## 2025-03-28 RX ORDER — MELOXICAM 15 MG/1
1 TABLET TABLET ORAL ONCE A DAY
Status: ON HOLD | COMMUNITY

## 2025-03-28 RX ORDER — RALOXIFENE HCL 60 MG
1 TABLET TABLET ORAL ONCE A DAY
Refills: 0 | Status: ACTIVE | COMMUNITY

## 2025-03-28 RX ORDER — LEVOTHYROXINE SODIUM 0.05 MG/1
1 CAPSULE TABLET ORAL ONCE A DAY
Refills: 0 | Status: ACTIVE | COMMUNITY
Start: 2019-08-27

## 2025-03-28 RX ORDER — VALSARTAN 160 MG/1
1 TABLET TABLET, FILM COATED ORAL ONCE A DAY
Status: ACTIVE | COMMUNITY

## 2025-03-28 RX ORDER — OMEPRAZOLE 20 MG/1
1 CAPSULE CAPSULE, DELAYED RELEASE ORAL ONCE A DAY
Refills: 3 | Status: ACTIVE | COMMUNITY
Start: 2017-12-05

## 2025-03-30 LAB
CALPROTECTIN, FECAL: 162
CLOSTRIDIUM DIFFICILE TOXINB,QL REAL TIME PCR: NOT DETECTED

## 2025-03-31 LAB
GGT: 152
IGG, SUBCLASS 1: 349
IGG, SUBCLASS 2: 310
IGG, SUBCLASS 3: 18
IGG, SUBCLASS 4: 33.8
IMMUNOGLOBULIN G, QN, SERUM: 746

## 2025-04-06 ENCOUNTER — WEB ENCOUNTER (OUTPATIENT)
Dept: URBAN - METROPOLITAN AREA CLINIC 113 | Facility: CLINIC | Age: 80
End: 2025-04-06

## 2025-04-27 ENCOUNTER — WEB ENCOUNTER (OUTPATIENT)
Dept: URBAN - METROPOLITAN AREA CLINIC 113 | Facility: CLINIC | Age: 80
End: 2025-04-27

## 2025-04-28 ENCOUNTER — LAB OUTSIDE AN ENCOUNTER (OUTPATIENT)
Dept: URBAN - METROPOLITAN AREA CLINIC 113 | Facility: CLINIC | Age: 80
End: 2025-04-28

## 2025-05-09 ENCOUNTER — WEB ENCOUNTER (OUTPATIENT)
Dept: URBAN - METROPOLITAN AREA CLINIC 113 | Facility: CLINIC | Age: 80
End: 2025-05-09

## 2025-05-14 ENCOUNTER — WEB ENCOUNTER (OUTPATIENT)
Dept: URBAN - METROPOLITAN AREA CLINIC 113 | Facility: CLINIC | Age: 80
End: 2025-05-14

## 2025-05-18 ENCOUNTER — WEB ENCOUNTER (OUTPATIENT)
Dept: URBAN - METROPOLITAN AREA CLINIC 113 | Facility: CLINIC | Age: 80
End: 2025-05-18

## 2025-05-22 ENCOUNTER — WEB ENCOUNTER (OUTPATIENT)
Dept: URBAN - METROPOLITAN AREA CLINIC 113 | Facility: CLINIC | Age: 80
End: 2025-05-22

## 2025-05-28 ENCOUNTER — LAB OUTSIDE AN ENCOUNTER (OUTPATIENT)
Dept: URBAN - METROPOLITAN AREA CLINIC 113 | Facility: CLINIC | Age: 80
End: 2025-05-28

## 2025-05-30 LAB
ALBUMIN/GLOBULIN RATIO: 1.7
ALBUMIN: 4.2
ALKALINE PHOSPHATASE: 76
ALT (SGPT): 15
AST (SGOT): 25
BILIRUBIN, DIRECT: 0.2
BILIRUBIN, INDIRECT: 0.7
BILIRUBIN, TOTAL: 0.9
BUN/CREATININE RATIO: 21
CALCIUM: 9.4
CARBON DIOXIDE: 25
CHLORIDE: 101
CREATININE: 1.06
EGFR: 54
GLOBULIN: 2.5
GLUCOSE: 90
POTASSIUM: 5.2
PROTEIN, TOTAL: 6.7
SODIUM: 136
UREA NITROGEN (BUN): 22

## 2025-06-05 ENCOUNTER — OFFICE VISIT (OUTPATIENT)
Dept: URBAN - METROPOLITAN AREA CLINIC 113 | Facility: CLINIC | Age: 80
End: 2025-06-05
Payer: MEDICARE

## 2025-06-05 ENCOUNTER — WEB ENCOUNTER (OUTPATIENT)
Dept: URBAN - METROPOLITAN AREA CLINIC 113 | Facility: CLINIC | Age: 80
End: 2025-06-05

## 2025-06-05 DIAGNOSIS — R74.8 ELEVATED LIVER ENZYMES: ICD-10-CM

## 2025-06-05 DIAGNOSIS — R19.7 DIARRHEA, UNSPECIFIED TYPE: ICD-10-CM

## 2025-06-05 PROCEDURE — 99215 OFFICE O/P EST HI 40 MIN: CPT | Performed by: INTERNAL MEDICINE

## 2025-06-05 RX ORDER — ROSUVASTATIN CALCIUM 5 MG
1 TABLET TABLET ORAL ONCE A DAY
Refills: 0 | Status: ACTIVE | COMMUNITY

## 2025-06-05 RX ORDER — OMEPRAZOLE 20 MG/1
1 CAPSULE CAPSULE, DELAYED RELEASE ORAL ONCE A DAY
Refills: 3 | Status: ACTIVE | COMMUNITY
Start: 2017-12-05

## 2025-06-05 RX ORDER — METOPROLOL TARTRATE 25 MG/1
1 TABLET WITH FOOD TABLET, FILM COATED ORAL TWICE A DAY
Status: ACTIVE | COMMUNITY

## 2025-06-05 RX ORDER — AMLODIPINE BESYLATE 5 MG/1
1 TABLET TABLET ORAL ONCE A DAY
Status: ON HOLD | COMMUNITY

## 2025-06-05 RX ORDER — RALOXIFENE HCL 60 MG
1 TABLET TABLET ORAL ONCE A DAY
Refills: 0 | Status: ACTIVE | COMMUNITY

## 2025-06-05 RX ORDER — ESZOPICLONE 1 MG/1
1 TABLET IMMEDIATELY BEFORE BEDTIME TABLET, COATED ORAL ONCE A DAY
Status: ON HOLD | COMMUNITY

## 2025-06-05 RX ORDER — MELOXICAM 15 MG/1
1 TABLET TABLET ORAL ONCE A DAY
Status: ON HOLD | COMMUNITY

## 2025-06-05 RX ORDER — DICYCLOMINE HYDROCHLORIDE 10 MG/1
TAKE ONE CAPSULE BY MOUTH THREE TIMES A DAY AS NEEDED CAPSULE ORAL
Qty: 60 CAPSULE | Refills: 0 | Status: ON HOLD | COMMUNITY

## 2025-06-05 RX ORDER — BUDESONIDE 3 MG/1
3 CAPSULES CAPSULE, DELAYED RELEASE PELLETS ORAL ONCE A DAY
Qty: 90 CAPSULE | Refills: 3 | Status: ON HOLD | COMMUNITY
Start: 2025-03-27

## 2025-06-05 RX ORDER — CHOLECALCIFEROL (VITAMIN D3) 50 MCG
1 TABLET TABLET ORAL ONCE A DAY
Status: ON HOLD | COMMUNITY

## 2025-06-05 RX ORDER — OLMESARTAN MEDOXOMIL 20 MG/1
TAKE 1 TABLET DAILY TABLET, FILM COATED ORAL
Refills: 0 | Status: ON HOLD | COMMUNITY
Start: 2018-07-16

## 2025-06-05 RX ORDER — URSODIOL 300 MG/1
1 CAPSULE CAPSULE ORAL TWICE A DAY
Qty: 60 | Refills: 3 | Status: ACTIVE | COMMUNITY
Start: 2025-03-27

## 2025-06-05 RX ORDER — VALSARTAN 160 MG/1
1 TABLET TABLET, FILM COATED ORAL ONCE A DAY
Status: ACTIVE | COMMUNITY

## 2025-06-05 RX ORDER — OCTISALATE, AVOBENZONE, HOMOSALATE, AND OCTOCRYLENE 29.4; 29.4; 49; 25.48 MG/ML; MG/ML; MG/ML; MG/ML
AS DIRECTED LOTION TOPICAL
Status: ON HOLD | COMMUNITY

## 2025-06-05 RX ORDER — VITAMIN B COMPLEX
AS DIRECTED CAPSULE ORAL
Status: ON HOLD | COMMUNITY

## 2025-06-05 RX ORDER — LEVOTHYROXINE SODIUM 0.05 MG/1
1 CAPSULE TABLET ORAL ONCE A DAY
Refills: 0 | Status: ACTIVE | COMMUNITY
Start: 2019-08-27

## 2025-06-05 NOTE — HPI-TODAY'S VISIT:
This is a 79-year-old female with a history of hypothyroidism, vitamin D deficiency, hyperlipidemia, leukopenia, hypertension, aortic regurgitation, SVT, osteopenia, Sjogren's, epigastric pain, GERD, hemorrhoids, chronic constipation, colon diverticulosis, left lower quadrant abdominal pain and diarrhea predominant change in bowel habits associated with IBS, and lymphocytic colitis presenting for follow-up. She was last seen here on 3/21/25.  She was previously seen in the office 10/24/2024.  She had irregular bowel habits associated with a functional bowel disorder.  She was constipated at baseline and manage symptoms with daily MiraLAX, Benefiber, align.  Every 5 to 6 months, she had diarrhea.  A trial of Xifaxan was recommended at her last visit but was cost prohibitive.  She was allergic to Flagyl.  When symptoms recurred, we were to consider a trial of amoxicillin per Dr. Ruiz to treat small intestinal bacterial overgrowth.  She reported red blood per rectum.  It was likely associated with internal hemorrhoids.  She was to continue daily fiber.  She was to use Preparation H suppositories at that time for 7 days.  If that was ineffective, she was to call for a prescription for Anusol.  At her 3/21/25 OV, she had 2 issues.  Firstly, she reported intermittent liver enzyme elevation for at least a year which had been trending upward.  She had an ultrasound that showed a possible fatty liver.  Secondly, she had experienced a change in bowel habits over the previous 3 weeks toward diarrhea.  At baseline, she has intermittent diarrhea that lasts days or 1 or 2 weeks and will resolve for a period of time and recur.  For the last 2 or 3 weeks, she reported loose or watery bowel movements after every meal which were infrequently urgent, as well as excessive abdominal "rumbling" without pain or cramping.  She had not been able to identify dietary triggers.  If she awakened to urinate at night, she might have a bowel movement.  She continued to take fiber 2 tablespoons daily and denied regular use of dairy products or recent antibiotic use.  She denied rectal bleeding or any other abdominal symptoms.  She was prescribed Cymbalta 2 months ago to treat body aches associated with Sjogren's.   Labs 3/4/2025 CBC: WBC 5.5, hemoglobin 11.4, MCV 96.7, platelet 234.  BMP normal with exception of sodium 133, creatinine 1.14.  LFTs: TB 0.7, , ALT 40, AST 64.  Lipid panel normal.  Vitamin D 25-hydroxy 32.  TSH 2.31.  Abdominal ultrasound 3/17/2025: Liver is slightly echogenic suggesting  mild fatty replacement.  Otherwise negative study.  The patient had a series of lab tests obtained after her last office visit on March 21.  These included hepatitis B and C serologies, both negative, and antinuclear antibody positive at a 1-320 titer, and IgG4 level which was normal at 33.8, basic metabolic panel notable for BUN of 29 and creatinine 1.26, and alkaline phosphatase of 100, AST 29, ALT 17, total bilirubin 0.7 and albumin of 4.1.  Her sed rate was 4, C-reactive protein 0.1, fecal lactoferrin was 46, fecal ova and parasites are negative, fecal calprotectin was 162 which is elevated, stool for C. difficile is negative, and stool for C&S is negative.  The patient had an abdominal sonogram done on March 17, 2025 which was essentially negative.  She had a FibroScan done which showed S0 steatosis and F1 fibrosis.  Because the negative stool studies and the positive fecal lactoferrin, it was felt that she likely had exacerbation of her microscopic colitis, which is a known diagnosis.  She was placed on budesonide and had resolution of her diarrheal complaints within 2 days.  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  The patient has no specific GI complaints at the present time. Stools are formed. She has now tapered off of budesonide but continues on Actigall. She is complaining of "brain fog" which she attributed to Cymbalta previously.  This has since been discontinued but she still has intermittent days where she is not as lucid as she would like.

## 2025-07-18 ENCOUNTER — WEB ENCOUNTER (OUTPATIENT)
Dept: URBAN - METROPOLITAN AREA CLINIC 113 | Facility: CLINIC | Age: 80
End: 2025-07-18

## 2025-07-25 ENCOUNTER — WEB ENCOUNTER (OUTPATIENT)
Dept: URBAN - METROPOLITAN AREA CLINIC 113 | Facility: CLINIC | Age: 80
End: 2025-07-25

## 2025-07-25 RX ORDER — URSODIOL 300 MG/1
1 CAPSULE CAPSULE ORAL TWICE A DAY
Qty: 60 | Refills: 11
Start: 2025-03-27

## 2025-08-08 ENCOUNTER — WEB ENCOUNTER (OUTPATIENT)
Dept: URBAN - METROPOLITAN AREA CLINIC 113 | Facility: CLINIC | Age: 80
End: 2025-08-08

## 2025-08-18 ENCOUNTER — WEB ENCOUNTER (OUTPATIENT)
Dept: URBAN - METROPOLITAN AREA CLINIC 113 | Facility: CLINIC | Age: 80
End: 2025-08-18